# Patient Record
Sex: FEMALE | Race: BLACK OR AFRICAN AMERICAN | Employment: FULL TIME | URBAN - METROPOLITAN AREA
[De-identification: names, ages, dates, MRNs, and addresses within clinical notes are randomized per-mention and may not be internally consistent; named-entity substitution may affect disease eponyms.]

---

## 2017-02-16 ENCOUNTER — OFFICE VISIT (OUTPATIENT)
Dept: FAMILY MEDICINE CLINIC | Age: 57
End: 2017-02-16

## 2017-02-16 VITALS
SYSTOLIC BLOOD PRESSURE: 157 MMHG | HEART RATE: 81 BPM | DIASTOLIC BLOOD PRESSURE: 76 MMHG | TEMPERATURE: 97.9 F | HEIGHT: 67 IN | RESPIRATION RATE: 20 BRPM | WEIGHT: 201.8 LBS | BODY MASS INDEX: 31.67 KG/M2

## 2017-02-16 DIAGNOSIS — R07.9 CHEST PAIN, UNSPECIFIED TYPE: Primary | ICD-10-CM

## 2017-02-16 DIAGNOSIS — R45.89 DEPRESSED MOOD: ICD-10-CM

## 2017-02-16 DIAGNOSIS — R94.31 ABNORMAL EKG: ICD-10-CM

## 2017-02-16 PROBLEM — Z87.19 HISTORY OF LOWER GI BLEEDING: Status: ACTIVE | Noted: 2017-02-16

## 2017-02-16 PROBLEM — K27.9 PEPTIC ULCER DISEASE: Status: ACTIVE | Noted: 2017-02-16

## 2017-02-16 NOTE — PROGRESS NOTES
HISTORY OF PRESENT ILLNESS  Marleni Smart is a 60 y.o. female. New Patient   The history is provided by the patient (she is here to establish care. She just had blood work done last week. ). Associated symptoms include chest pain, headaches and shortness of breath. Pertinent negatives include no abdominal pain. Other   The history is provided by the patient (her blood pressure is elevated today. She is under a lot of stress right now. ). This is a new problem. Episode onset: today. The problem occurs constantly. Progression since onset: unknown. Associated symptoms include chest pain, headaches and shortness of breath. Pertinent negatives include no abdominal pain. Associated symptoms comments: Periodic chest pain. She gets stress headaches. . The symptoms are aggravated by stress. Nothing relieves the symptoms. She has tried nothing for the symptoms. Review of Systems   Constitutional: Positive for diaphoresis and malaise/fatigue. Negative for weight loss. No weight gain   Eyes: Negative for blurred vision. Respiratory: Positive for shortness of breath. Cardiovascular: Positive for chest pain. Negative for leg swelling. The chest pain is sometimes like pressure, sometimes associated with diaphoresis and shortness of breath   Gastrointestinal: Negative for abdominal pain. Neurological: Positive for headaches. Negative for dizziness, sensory change, speech change and focal weakness. Psychiatric/Behavioral: Positive for depression. Negative for suicidal ideas. Visit Vitals    /76 (BP 1 Location: Left arm, BP Patient Position: Sitting)    Pulse 81    Temp 97.9 °F (36.6 °C) (Oral)    Resp 20    Ht 5' 7\" (1.702 m)    Wt 201 lb 12.8 oz (91.5 kg)    BMI 31.61 kg/m2     Physical Exam   Constitutional: She is oriented to person, place, and time. She appears well-developed and well-nourished. No distress.    Cardiovascular: Normal rate, regular rhythm and normal heart sounds. Exam reveals no gallop and no friction rub. No murmur heard. Pulmonary/Chest: Effort normal and breath sounds normal. No respiratory distress. She has no wheezes. She has no rales. Musculoskeletal: She exhibits no edema. Neurological: She is alert and oriented to person, place, and time. Skin: Skin is warm and dry. She is not diaphoretic. Nursing note and vitals reviewed. EKG - NSR without STTW changes, left axis anterior fascicular block, LVH    ASSESSMENT and PLAN    ICD-10-CM ICD-9-CM    1. Chest pain, unspecified type R07.9 786.50 AMB POC EKG ROUTINE W/ 12 LEADS, INTER & REP   2. Abnormal EKG R94.31 794.31    3. Elevated blood pressure I10 401.9    4. Depressed mood F32.9 311         Concerned about cardiac etiology of her chest pain  We have gotten her a Cardiology appointment for tomorrow am    Follow-up Disposition:  Return in about 1 week (around 2/23/2017) for depressed mood, check blood pressure. Reviewed plan of care. Patient has provided input and agrees with goals.

## 2017-02-16 NOTE — MR AVS SNAPSHOT
Visit Information Date & Time Provider Department Dept. Phone Encounter #  
 2/16/2017  9:45 AM Sandhya MotaSandra 34 [id number] Your Appointments 2/17/2017  9:20 AM  
New Patient with Percy Ramos MD  
[facility] ([facility]) Appt Note: Dr Helen Tuttle Dx Chest Pressure CC Holds Record [address] [facility] 600 [address] [place] [address] [address] [facility] [id number] [address] Upcoming Health Maintenance Date Due Hepatitis C Screening 1960 Pneumococcal 19-64 Medium Risk (1 of 1 - PPSV23) 5/12/1977 DTaP/Tdap/Td series (1 - Tdap) 5/12/1979 PAP AKA CERVICAL CYTOLOGY 5/12/1979 BREAST CANCER SCRN MAMMOGRAM 5/12/2008 FOBT Q 1 YEAR AGE 50-75 5/12/2008 FOOT EXAM Q1 8/1/2015 EYE EXAM RETINAL OR DILATED Q1 8/1/2015 HEMOGLOBIN A1C Q6M 9/20/2015 MICROALBUMIN Q1 3/20/2016 LIPID PANEL Q1 3/20/2016 INFLUENZA AGE 9 TO ADULT 8/1/2016 Allergies as of 2/16/2017  Review Complete On: 2/16/2017 By: Sandhya Mota MD  
  
 Severity Noted Reaction Type Reactions Fioricet [Butalbital-acetaminophen-caff] High 08/06/2012    Anaphylaxis, Hives Fiorinal [Butalbital-aspirin-caffeine] High 08/06/2012    Anaphylaxis, Hives Current Immunizations  Never Reviewed No immunizations on file. Not reviewed this visit You Were Diagnosed With   
  
 Codes Comments Chest pain, unspecified type    -  Primary ICD-10-CM: R07.9 ICD-9-CM: 786.50 Abnormal EKG     ICD-10-CM: R94.31 
ICD-9-CM: 794.31 Elevated blood pressure     ICD-10-CM: I10 
ICD-9-CM: 401.9 Depressed mood     ICD-10-CM: F32.9 ICD-9-CM: 709 Vitals BP Pulse Temp Resp Height(growth percentile) Weight(growth percentile)  157/76 (BP 1 Location: Left arm, BP Patient Position: Sitting) 81 97.9 °F (36.6 °C) (Oral) 20 5' 7\" (1.702 m) 201 lb 12.8 oz (91.5 kg) BMI OB Status Smoking Status 31.61 kg/m2 Hysterectomy Former Smoker Vitals History BMI and BSA Data Body Mass Index Body Surface Area  
 31.61 kg/m 2 2.08 m 2 Preferred Pharmacy Pharmacy Name Phone [facility] [phone] Your Updated Medication List  
  
   
This list is accurate as of: 2/16/17 11:28 AM.  Always use your most recent med list.  
  
  
  
  
 CALCIUM-VITAMIN D PO Take  by mouth. FISH OIL PO Take  by mouth. glucose blood VI test strips strip Commonly known as:  [address] Test 2 times daily, Dx. Code 250.00  
  
 metFORMIN 500 mg tablet Commonly known as:  GLUCOPHAGE Take 2 Tabs by mouth two (2) times daily (with meals). STOP GLUMETZA  
  
 multivitamin tablet Commonly known as:  ONE A DAY Take 1 Tab by mouth daily. pantoprazole 40 mg tablet Commonly known as:  PROTONIX Take 40 mg by mouth daily. ZyrTEC 10 mg tablet Generic drug:  cetirizine Take  by mouth as needed. We Performed the Following AMB POC EKG ROUTINE W/ 12 LEADS, INTER & REP [75784 CPT(R)] Introducing [facility] SERVICES! Jennifer Rosenberg introduces Runivermag patient portal. Now you can access parts of your medical record, email your doctor's office, and request medication refills online. 1. In your internet browser, go to [email or web address] 2. Click on the First Time User? Click Here link in the Sign In box. You will see the New Member Sign Up page. 3. Enter your Runivermag Access Code exactly as it appears below. You will not need to use this code after youve completed the sign-up process. If you do not sign up before the expiration date, you must request a new code. · Runivermag Access Code: [email or web address] Expires: 5/17/2017 11:28 AM 
 
4.  Enter the last four digits of your Social Security Number (xxxx) and Date of Birth (mm/dd/yyyy) as indicated and click Submit. You will be taken to the next sign-up page. 5. Create a madvertise ID. This will be your madvertise login ID and cannot be changed, so think of one that is secure and easy to remember. 6. Create a madvertise password. You can change your password at any time. 7. Enter your Password Reset Question and Answer. This can be used at a later time if you forget your password. 8. Enter your e-mail address. You will receive e-mail notification when new information is available in [address] 9. Click Sign Up. You can now view and download portions of your medical record. 10. Click the Download Summary menu link to download a portable copy of your medical information. If you have questions, please visit the Frequently Asked Questions section of the madvertise website. Remember, madvertise is NOT to be used for urgent needs. For medical emergencies, dial 911. Now available from your iPhone and Android! Please provide this summary of care documentation to your next provider. Your primary care clinician is listed as [address]. If you have any questions after today's visit, please call [phone].

## 2017-02-16 NOTE — PROGRESS NOTES
Chief Complaint   Patient presents with    New Patient   [facility]       Given verbal order by Dr. Cedrick Adan to order Amb Poc EKG Routine w/12 leads.

## 2017-08-17 ENCOUNTER — OFFICE VISIT (OUTPATIENT)
Dept: FAMILY MEDICINE CLINIC | Age: 57
End: 2017-08-17

## 2017-08-17 VITALS
OXYGEN SATURATION: 99 % | HEART RATE: 79 BPM | HEIGHT: 67 IN | BODY MASS INDEX: 30.4 KG/M2 | WEIGHT: 193.7 LBS | DIASTOLIC BLOOD PRESSURE: 80 MMHG | SYSTOLIC BLOOD PRESSURE: 140 MMHG | TEMPERATURE: 97.5 F | RESPIRATION RATE: 18 BRPM

## 2017-08-17 DIAGNOSIS — M54.42 ACUTE LEFT-SIDED LOW BACK PAIN WITH LEFT-SIDED SCIATICA: Primary | ICD-10-CM

## 2017-08-17 RX ORDER — CYCLOBENZAPRINE HCL 10 MG
TABLET ORAL
COMMUNITY
End: 2018-01-10

## 2017-08-17 NOTE — PROGRESS NOTES
Subjective:      Ford Lindsey is a 59 y.o. female who presents today for back and hip pain. 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 She states she felt pain immediately and it has since worsened. Pain is rated 7/10. She has used Flexeril at home and by Tuesday evening progressively worse. She last took this Wednesday night. She states symptoms helped her sleep. She states symptoms are now to her lower back with pain radiating down left hip and leg. She has a history of lumbago and DDD. She saw her PCP about this past Tuesday and was told to rest, use ice, and the flexeril. Patient Active Problem List    Diagnosis Date Noted    Peptic ulcer disease 02/16/2017    History of lower GI bleeding 02/16/2017    Vitamin D deficiency     DM (diabetes mellitus) ([facility] [address] [facility]     Degenerative disc disease     Costalchondritis      Current Outpatient Prescriptions   Medication Sig Dispense Refill    metFORMIN (GLUCOPHAGE) 500 mg tablet Take 2 Tabs by mouth two (2) times daily (with meals). STOP GLUMETZA 120 Tab 6    glucose blood VI test strips (ACCU-CHEK SMARTVIEW TEST STRIP) strip Test 2 times daily, Dx. Code 250.00 200 Strip 4    CALCIUM CARB/VIT D3/MINERALS (CALCIUM-VITAMIN D PO) Take  by mouth.  DOCOSAHEXANOIC ACID/EPA (FISH OIL PO) Take  by mouth.  multivitamin (ONE A DAY) tablet Take 1 Tab by mouth daily.  pantoprazole (PROTONIX) 40 mg tablet Take 40 mg by mouth daily.  cetirizine (ZYRTEC) 10 mg tablet Take  by mouth as needed.        Allergies   Allergen Reactions    Fioricet [Butalbital-Acetaminophen-Caff] Anaphylaxis and Hives    Fiorinal [Butalbital-Aspirin-Caffeine] Anaphylaxis and Hives     Past Medical History:   Diagnosis Date    Costalchondritis     Degenerative disc disease     DM (diabetes mellitus) ([facility] [address] [facility]     Ectopic pregnancy     Fibromyalgia     History of lower GI bleeding 2/16/2017    Peptic ulcer disease 2/16/2017    Vitamin D deficiency      Family History   Problem Relation Age of Onset    Hypertension Mother     Elevated Lipids Mother     Other Mother      brain aneurysm    Hypertension Father     COPD Father     Other Sister      brain aneurysm    No Known Problems Brother     No Known Problems Brother     No Known Problems Brother     No Known Problems Brother     Other Brother      hep C    Substance Abuse Brother     Substance Abuse Brother     Other Brother      hep C    Diabetes Brother     Hypertension Brother     Elevated Lipids Brother     No Known Problems Brother     Cancer Sister      throat cancer    Diabetes Sister     Heart Disease Sister 50    Arthritis-osteo Sister     No Known Problems Son      Social History   Substance Use Topics    Smoking status: Former Smoker     Packs/day: 2.00     Years: 15.00     Quit date: 1/1/2002    Smokeless tobacco: Never Used    Alcohol use No        Review of Systems    A comprehensive review of systems was negative except for that written in the HPI. Objective:     Visit Vitals    /80    Pulse 79    Temp 97.5 °F (36.4 °C) (Oral)    Resp 18    Ht 5' 7\" (1.702 m)    Wt 193 lb 11.2 oz (87.9 kg)    SpO2 99%    BMI 30.34 kg/m2     General appearance: alert, cooperative, appears stated age, appears in pain  Head: Normocephalic, without obvious abnormality, atraumatic  Eyes: negative  Neck: supple, symmetrical, trachea midline, no adenopathy and no cervical spinal tenderness  Back: symmetric, no curvature. Decreased rotational ROM. No CVA tenderness. Pain with light palpation to left hip. Positive straight leg raise to left side. Unable to do complete back exam due to pain.    Lungs: clear to auscultation bilaterally  Heart: regular rate and rhythm, S1, S2 normal, no murmur, click, rub or gallop  Extremities: extremities normal, atraumatic, no cyanosis or edema  Pulses: 2+ and symmetric  Neurologic: Alert and oriented X 3, normal strength and tone. Normal symmetric reflexes. Normal coordination and gait  Nursing note and vitals reviewed  Assessment/Plan:       ICD-10-CM ICD-9-CM    1. Acute left-sided low back pain with left-sided sciatica M54.42 724.2      724.3           Advised her to call back or return to office if symptoms worsen/change/persist.  Discussed expected course/resolution/complications of diagnosis in detail with patient. Medication risks/benefits/costs/interactions/alternatives discussed with patient. She was given an after visit summary which includes diagnoses, current medications, & vitals. She expressed understanding with the diagnosis and plan.

## 2017-08-17 NOTE — PROGRESS NOTES
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 Used Flexeril at home and by Tuesday evening progressively worse.  Low back with pain radiating down left hip and leg

## 2017-08-21 ENCOUNTER — OFFICE VISIT (OUTPATIENT)
Dept: FAMILY MEDICINE CLINIC | Age: 57
End: 2017-08-21

## 2017-08-21 VITALS
RESPIRATION RATE: 16 BRPM | TEMPERATURE: 98 F | HEIGHT: 67 IN | OXYGEN SATURATION: 98 % | WEIGHT: 193 LBS | HEART RATE: 64 BPM | DIASTOLIC BLOOD PRESSURE: 77 MMHG | BODY MASS INDEX: 30.29 KG/M2 | SYSTOLIC BLOOD PRESSURE: 139 MMHG

## 2017-08-21 DIAGNOSIS — M54.50 ACUTE MIDLINE LOW BACK PAIN WITHOUT SCIATICA: Primary | ICD-10-CM

## 2017-08-21 RX ORDER — DICLOFENAC SODIUM 10 MG/G
GEL TOPICAL 4 TIMES DAILY
Qty: 100 G | Refills: 1 | Status: SHIPPED | OUTPATIENT
Start: 2017-08-21 | End: 2018-01-31

## 2017-08-21 NOTE — MR AVS SNAPSHOT
Visit Information Date & Time Provider Department Dept. Phone Encounter #  
 8/21/2017  3:45 [facility] [phone] [id number] Follow-up Instructions Return in about 1 week (around 8/28/2017), or if symptoms worsen or fail to improve. Upcoming Health Maintenance Date Due Hepatitis C Screening 1960 Pneumococcal 19-64 Medium Risk (1 of 1 - PPSV23) 5/12/1977 DTaP/Tdap/Td series (1 - Tdap) 5/12/1979 PAP AKA CERVICAL CYTOLOGY 5/12/1979 BREAST CANCER SCRN MAMMOGRAM 5/12/2008 FOBT Q 1 YEAR AGE 50-75 5/12/2008 FOOT EXAM Q1 8/1/2015 EYE EXAM RETINAL OR DILATED Q1 8/1/2015 HEMOGLOBIN A1C Q6M 9/20/2015 MICROALBUMIN Q1 3/20/2016 LIPID PANEL Q1 3/20/2016 INFLUENZA AGE 9 TO ADULT 8/1/2017 Allergies as of 8/21/2017  Review Complete On: 8/21/2017 By: Nato Diaz DO Severity Noted Reaction Type Reactions Latex Medium 08/17/2017    Itching Fioricet [Butalbital-acetaminophen-caff] High 08/06/2012    Anaphylaxis, Hives Fiorinal [Butalbital-aspirin-caffeine] High 08/06/2012    Anaphylaxis, Hives Shellfish Derived Medium 08/17/2017    Hives Current Immunizations  Never Reviewed No immunizations on file. Not reviewed this visit You Were Diagnosed With   
  
 Codes Comments Acute midline low back pain without sciatica    -  Primary ICD-10-CM: M54.5 ICD-9-CM: 724.2 Vitals BP Pulse Temp Resp Height(growth percentile) Weight(growth percentile) 139/77 64 98 °F (36.7 °C) (Oral) 16 5' 7\" (1.702 m) 193 lb (87.5 kg) SpO2 BMI OB Status Smoking Status 98% 30.23 kg/m2 Hysterectomy Former Smoker Vitals History BMI and BSA Data Body Mass Index Body Surface Area  
 30.23 kg/m 2 2.03 m 2 Preferred Pharmacy Pharmacy Name Phone [facility] [address] Your Updated Medication List  
  
 This list is accurate as of: 8/21/17  4:07 PM.  Always use your most recent med list.  
  
  
  
  
 CALCIUM-VITAMIN D PO Take  by mouth. cyclobenzaprine 10 mg tablet Commonly known as:  FLEXERIL Take  by mouth three (3) times daily as needed for Muscle Spasm(s). diclofenac 1 % Gel Commonly known as:  VOLTAREN Apply  to affected area four (4) times daily. As needed FISH OIL PO Take  by mouth. glucose blood VI test strips strip Commonly known as:  [address] Test 2 times daily, Dx. Code 250.00  
  
 metFORMIN 500 mg tablet Commonly known as:  GLUCOPHAGE Take 2 Tabs by mouth two (2) times daily (with meals). STOP GLUMETZA  
  
 multivitamin tablet Commonly known as:  ONE A DAY Take 1 Tab by mouth daily. pantoprazole 40 mg tablet Commonly known as:  PROTONIX Take 40 mg by mouth daily. ZyrTEC 10 mg tablet Generic drug:  cetirizine Take  by mouth as needed. Prescriptions Sent to Pharmacy Refills  
 diclofenac (VOLTAREN) 1 % gel 1 Sig: Apply  to affected area four (4) times daily. As needed Class: Normal  
 Pharmacy: [address] [place] [address] #: [phone] Route: Topical  
  
Follow-up Instructions Return in about 1 week (around 8/28/2017), or if symptoms worsen or fail to improve. Patient Instructions Low Back Pain: Exercises Your Care Instructions Here are some examples of typical rehabilitation exercises for your condition. Start each exercise slowly. Ease off the exercise if you start to have pain. Your doctor or physical therapist will tell you when you can start these exercises and which ones will work best for you. How to do the exercises Press-up 1. Lie on your stomach, supporting your body with your forearms. 2. Press your elbows down into the floor to raise your upper back.  As you do this, relax your stomach muscles and allow your back to arch without using your back muscles. As your press up, do not let your hips or pelvis come off the floor. 3. Hold for 15 to 30 seconds, then relax. 4. Repeat 2 to 4 times. Alternate arm and leg (bird dog) exercise Note: Do this exercise slowly. Try to keep your body straight at all times, and do not let one hip drop lower than the other. 1. Start on the floor, on your hands and knees. 2. Tighten your belly muscles. 3. Raise one leg off the floor, and hold it straight out behind you. Be careful not to let your hip drop down, because that will twist your trunk. 4. Hold for about 6 seconds, then lower your leg and switch to the other leg. 5. Repeat 8 to 12 times on each leg. 6. Over time, work up to holding for 10 to 30 seconds each time. 7. If you feel stable and secure with your leg raised, try raising the opposite arm straight out in front of you at the same time. Knee-to-chest exercise 1. Lie on your back with your knees bent and your feet flat on the floor. 2. Bring one knee to your chest, keeping the other foot flat on the floor (or keeping the other leg straight, whichever feels better on your lower back). 3. Keep your lower back pressed to the floor. Hold for at least 15 to 30 seconds. 4. Relax, and lower the knee to the starting position. 5. Repeat with the other leg. Repeat 2 to 4 times with each leg. 6. To get more stretch, put your other leg flat on the floor while pulling your knee to your chest. 
Curl-ups 1. Lie on the floor on your back with your knees bent at a 90-degree angle. Your feet should be flat on the floor, about 12 inches from your buttocks. 2. Cross your arms over your chest. If this bothers your neck, try putting your hands behind your neck (not your head), with your elbows spread apart. 3. Slowly tighten your belly muscles and raise your shoulder blades off the floor.  
4. Keep your head in line with your body, and do not press your chin to your chest. 
5. Hold this position for 1 or 2 seconds, then slowly lower yourself back down to the floor. 6. Repeat 8 to 12 times. Pelvic tilt exercise 1. Lie on your back with your knees bent. 2. \"Brace\" your stomach. This means to tighten your muscles by pulling in and imagining your belly button moving toward your spine. You should feel like your back is pressing to the floor and your hips and pelvis are rocking back. 3. Hold for about 6 seconds while you breathe smoothly. 4. Repeat 8 to 12 times. Heel dig bridging 1. Lie on your back with both knees bent and your ankles bent so that only your heels are digging into the floor. Your knees should be bent about 90 degrees. 2. Then push your heels into the floor, squeeze your buttocks, and lift your hips off the floor until your shoulders, hips, and knees are all in a straight line. 3. Hold for about 6 seconds as you continue to breathe normally, and then slowly lower your hips back down to the floor and rest for up to 10 seconds. 4. Do 8 to 12 repetitions. Hamstring stretch in doorway 1. Lie on your back in a doorway, with one leg through the open door. 2. Slide your leg up the wall to straighten your knee. You should feel a gentle stretch down the back of your leg. 3. Hold the stretch for at least 15 to 30 seconds. Do not arch your back, point your toes, or bend either knee. Keep one heel touching the floor and the other heel touching the wall. 4. Repeat with your other leg. 5. Do 2 to 4 times for each leg. Hip flexor stretch 1. Kneel on the floor with one knee bent and one leg behind you. Place your forward knee over your foot. Keep your other knee touching the floor. 2. Slowly push your hips forward until you feel a stretch in the upper thigh of your rear leg. 3. Hold the stretch for at least 15 to 30 seconds. Repeat with your other leg. 4. Do 2 to 4 times on each side. Wall sit 1.  Stand with your back 10 to 12 inches away from a wall. 
2. Lean into the wall until your back is flat against it. 3. Slowly slide down until your knees are slightly bent, pressing your lower back into the wall. 4. Hold for about 6 seconds, then slide back up the wall. 5. Repeat 8 to 12 times. Follow-up care is a key part of your treatment and safety. Be sure to make and go to all appointments, and call your doctor if you are having problems. It's also a good idea to know your test results and keep a list of the medicines you take. Where can you learn more? Go to http://alysa-raquel.info/. Enter J107 in the search box to learn more about \"Low Back Pain: Exercises. \" Current as of: March 21, 2017 Content Version: 11.3 © 4402-1840 Healthwise, [employer]orporated. Care instructions adapted under license by [employer] (which disclaims liability or warranty for this information). If you have questions about a medical condition or this instruction, always ask your healthcare professional. [place] [address] any warranty or liability for your use of this information. Introducing [facility] & HEALTH SERVICES! [facility] introduces [employer] patient portal. Now you can access parts of your medical record, email your doctor's office, and request medication refills online. 1. In your internet browser, go to [email or web address] 2. Click on the First Time User? Click Here link in the Sign In box. You will see the New Member Sign Up page. 3. Enter your Xormis Access Code exactly as it appears below. You will not need to use this code after youve completed the sign-up process. If you do not sign up before the expiration date, you must request a new code. · Xormis Access Code: [email or web address] Expires: 11/19/2017  4:07 PM 
 
4. Enter the last four digits of your Social Security Number (xxxx) and Date of Birth (mm/dd/yyyy) as indicated and click Submit. You will be taken to the next sign-up page.  
5. Create a Clarus Systems ID. This will be your [employer] login ID and cannot be changed, so think of one that is secure and easy to remember. 6. Create a [employer] password. You can change your password at any time. 7. Enter your Password Reset Question and Answer. This can be used at a later time if you forget your password. 8. Enter your e-mail address. You will receive e-mail notification when new information is available in [address] 9. Click Sign Up. You can now view and download portions of your medical record. 10. Click the Download Summary menu link to download a portable copy of your medical information. If you have questions, please visit the Frequently Asked Questions section of the [employer] website. Remember, [employer] is NOT to be used for urgent needs. For medical emergencies, dial 911. Now available from your iPhone and Android! Please provide this summary of care documentation to your next provider. Your primary care clinician is listed as [address] [facility]. If you have any questions after today's visit, please call [phone].

## 2017-08-21 NOTE — PATIENT INSTRUCTIONS

## 2017-08-21 NOTE — PROGRESS NOTES
Chief Complaint   Patient presents with    Follow-up     Still with pain in the back now centralized in low back and non raditating

## 2017-08-21 NOTE — PROGRESS NOTES
Mary Acosta is a 59 y.o. female   Chief Complaint   Patient presents with    Follow-up     Still with pain in the back now centralized in low back and non raditating    pt states her pain was a 7/10 a week ago and feels like it is currently a 3/10 as long as she is not doing a lot of lifting. Pain is midline of her lumbar spine. Pain most noticed today standing for a prolonged period of time. Pt thinks she can lift >5 lbs but can not bend to restrain or to lift/pull or transfer someone. Pt is going to be getting an appt with orthopedics for f/u of this workmans comp claim as well. Pt thinks she does not need to see ortho. Pt has been using flexeril, ice and heat with some relief. she is a 59y.o. year old female who presents for evalution. Reviewed PmHx, RxHx, FmHx, SocHx, AllgHx and updated and dated in the chart. Review of Systems - negative except as listed above in the HPI    Objective:     Vitals:    08/21/17 1543   BP: 139/77   Pulse: 64   Resp: 16   Temp: 98 °F (36.7 °C)   TempSrc: Oral   SpO2: 98%   Weight: 193 lb (87.5 kg)   Height: 5' 7\" (1.702 m)       Current Outpatient Prescriptions   Medication Sig    diclofenac (VOLTAREN) 1 % gel Apply  to affected area four (4) times daily. As needed    cyclobenzaprine (FLEXERIL) 10 mg tablet Take  by mouth three (3) times daily as needed for Muscle Spasm(s).  metFORMIN (GLUCOPHAGE) 500 mg tablet Take 2 Tabs by mouth two (2) times daily (with meals). STOP GLUMETZA    glucose blood VI test strips (ACCU-CHEK SMARTVIEW TEST STRIP) strip Test 2 times daily, Dx. Code 250.00    CALCIUM CARB/VIT D3/MINERALS (CALCIUM-VITAMIN D PO) Take  by mouth.  DOCOSAHEXANOIC ACID/EPA (FISH OIL PO) Take  by mouth.  multivitamin (ONE A DAY) tablet Take 1 Tab by mouth daily.  pantoprazole (PROTONIX) 40 mg tablet Take 40 mg by mouth daily.  cetirizine (ZYRTEC) 10 mg tablet Take  by mouth as needed.      No current facility-administered medications for this visit. Physical Examination: General appearance - alert, well appearing, and in no distress  Eyes - pupils equal and reactive, extraocular eye movements intact  Chest - clear to auscultation, no wheezes, rales or rhonchi, symmetric air entry  Heart - normal rate, regular rhythm, normal S1, S2, no murmurs, rubs, clicks or gallops  Back exam - limited range of motion, tenderness noted midline lumbar, neg slr b/l      Assessment/ Plan:   Diagnoses and all orders for this visit:    1. Acute midline low back pain without sciatica  -     diclofenac (VOLTAREN) 1 % gel; Apply  to affected area four (4) times daily. As needed   c/w flexeril and warm compresses, f/u 8/9 for re eval, pt may hold off on ortho for now    No lifting >10 lbs, no bending crawling squatting kneeling pushing or pulling for now  Follow-up Disposition:  Return in about 1 week (around 8/28/2017), or if symptoms worsen or fail to improve. I have discussed the diagnosis with the patient and the intended plan as seen in the above orders. The patient has received an after-visit summary and questions were answered concerning future plans. Pt conveyed understanding of plan.     Medication Side Effects and Warnings were discussed with patient      Dereje Lyn,

## 2017-08-25 ENCOUNTER — OFFICE VISIT (OUTPATIENT)
Dept: FAMILY MEDICINE CLINIC | Age: 57
End: 2017-08-25

## 2017-08-25 VITALS
WEIGHT: 193 LBS | HEART RATE: 66 BPM | SYSTOLIC BLOOD PRESSURE: 141 MMHG | OXYGEN SATURATION: 100 % | HEIGHT: 67 IN | BODY MASS INDEX: 30.29 KG/M2 | RESPIRATION RATE: 16 BRPM | TEMPERATURE: 97.2 F | DIASTOLIC BLOOD PRESSURE: 76 MMHG

## 2017-08-25 DIAGNOSIS — S39.012S LOW BACK STRAIN, SEQUELA: Primary | ICD-10-CM

## 2017-08-25 NOTE — MR AVS SNAPSHOT
Visit Information Date & Time Provider Department Dept. Phone Encounter #  
 8/25/2017  8:00 AM Kia Gupta MD [address] [facility] [place] [phone] [id number] Upcoming Health Maintenance Date Due Hepatitis C Screening 1960 Pneumococcal 19-64 Medium Risk (1 of 1 - PPSV23) 5/12/1977 DTaP/Tdap/Td series (1 - Tdap) 5/12/1979 PAP AKA CERVICAL CYTOLOGY 5/12/1979 BREAST CANCER SCRN MAMMOGRAM 5/12/2008 FOBT Q 1 YEAR AGE 50-75 5/12/2008 FOOT EXAM Q1 8/1/2015 EYE EXAM RETINAL OR DILATED Q1 8/1/2015 HEMOGLOBIN A1C Q6M 9/20/2015 MICROALBUMIN Q1 3/20/2016 LIPID PANEL Q1 3/20/2016 INFLUENZA AGE 9 TO ADULT 8/1/2017 Allergies as of 8/25/2017  Review Complete On: 8/25/2017 By: Kia Gupta MD  
  
 Severity Noted Reaction Type Reactions Latex Medium 08/17/2017    Itching Fioricet [Butalbital-acetaminophen-caff] High 08/06/2012    Anaphylaxis, Hives Fiorinal [Butalbital-aspirin-caffeine] High 08/06/2012    Anaphylaxis, Hives Shellfish Derived Medium 08/17/2017    Hives Current Immunizations  Never Reviewed No immunizations on file. Not reviewed this visit You Were Diagnosed With   
  
 Codes Comments Low back strain, sequela    -  Primary ICD-10-CM: S39.012S ICD-9-CM: 654. 7 Vitals BP Pulse Temp Resp Height(growth percentile) Weight(growth percentile) 141/76 66 97.2 °F (36.2 °C) (Oral) 16 5' 7\" (1.702 m) 193 lb (87.5 kg) SpO2 BMI OB Status Smoking Status 100% 30.23 kg/m2 Hysterectomy Former Smoker Vitals History BMI and BSA Data Body Mass Index Body Surface Area  
 30.23 kg/m 2 2.03 m 2 Preferred Pharmacy Pharmacy Name Phone [address] [place] [address], [address] Your Updated Medication List  
  
   
This list is accurate as of: 8/25/17 10:45 AM.  Always use your most recent med list.  
  
  
  
  
 CALCIUM-VITAMIN D PO Take  by mouth. cyclobenzaprine 10 mg tablet Commonly known as:  FLEXERIL Take  by mouth three (3) times daily as needed for Muscle Spasm(s). diclofenac 1 % Gel Commonly known as:  VOLTAREN Apply  to affected area four (4) times daily. As needed FISH OIL PO Take  by mouth. glucose blood VI test strips strip Commonly known as:  [address] Test 2 times daily, Dx. Code 250.00  
  
 metFORMIN 500 mg tablet Commonly known as:  GLUCOPHAGE Take 2 Tabs by mouth two (2) times daily (with meals). STOP GLUMETZA  
  
 multivitamin tablet Commonly known as:  ONE A DAY Take 1 Tab by mouth daily. pantoprazole 40 mg tablet Commonly known as:  PROTONIX Take 40 mg by mouth daily. ZyrTEC 10 mg tablet Generic drug:  cetirizine Take  by mouth as needed. Introducing \[facility]\"" & HEALTH SERVICES! [facility] introduces [employer] patient portal. Now you can access parts of your medical record, email your doctor's office, and request medication refills online. 1. In your internet browser, go to [email or web address] 2. Click on the First Time User? Click Here link in the Sign In box. You will see the New Member Sign Up page. 3. Enter your [employer] Access Code exactly as it appears below. You will not need to use this code after youve completed the sign-up process. If you do not sign up before the expiration date, you must request a new code. · [employer] Access Code: [email or web address] Expires: 11/19/2017  4:07 PM 
 
4. Enter the last four digits of your Social Security Number (xxxx) and Date of Birth (mm/dd/yyyy) as indicated and click Submit. You will be taken to the next sign-up page. 5. Create a [employer] Fortress ID. This will be your Digital Fortress login ID and cannot be changed, so think of one that is secure and easy to remember. 6. Create a [employer] password. You can change your password at any time.  
7. Enter your Password Reset Question and Answer. This can be used at a later time if you forget your password. 8. Enter your e-mail address. You will receive e-mail notification when new information is available in [address] 9. Click Sign Up. You can now view and download portions of your medical record. 10. Click the Download Summary menu link to download a portable copy of your medical information. If you have questions, please visit the Frequently Asked Questions section of the [employer] network logo website. Remember, [employer] network logo is NOT to be used for urgent needs. For medical emergencies, dial 911. Now available from your iPhone and Android! Please provide this summary of care documentation to your next provider. If you have any questions after today's visit, please call [phone].

## 2017-08-25 NOTE — PROGRESS NOTES
Chief Complaint   Patient presents with    Follow-up     Follow up for [facility] visit.  Pain now at a 1/10 in back

## 2017-08-25 NOTE — PROGRESS NOTES
Chief Complaint   Patient presents with    Follow-up     Follow up for [facility] visit. Pain now at a 1/10 in back     Pt reports that she has been able to gradually resume normal activities. Pt is still using voltaren gel, flexeril and occasionally tylenol. Pt feels ready to resume full duty at work. Pt reports that pain has decreased from 7/10 at time of initial injury, to 3/10 at her last follow up to 1/10 today. Subjective: (As above and below)     Chief Complaint   Patient presents with    Follow-up     Follow up for [facility] visit. Pain now at a 1/10 in back     she is a 59y.o. year old female who presents for evaluation. Reviewed PmHx, RxHx, FmHx, SocHx, AllgHx and updated in chart. Review of Systems - negative except as listed above    Objective:     Vitals:    08/25/17 0812   BP: 141/76   Pulse: 66   Resp: 16   Temp: 97.2 °F (36.2 °C)   TempSrc: Oral   SpO2: 100%   Weight: 193 lb (87.5 kg)   Height: 5' 7\" (1.702 m)     Physical Examination: General appearance - alert, well appearing, and in no distress  Mental status - normal mood, behavior, speech, dress, motor activity, and thought processes  Mouth - mucous membranes moist, pharynx normal without lesions  Chest - clear to auscultation, no wheezes, rales or rhonchi, symmetric air entry  Heart - normal rate, regular rhythm, normal S1, S2, no murmurs, rubs, clicks or gallops  Back exam - full range of motion, mild tenderness over lower midline l-spine, no palpable spasm or pain on motion, sacroiliac joints and sciatic notches nontender    Assessment/ Plan:   1. Low back strain, sequela  -continue medication as needed for PRN use  -may resume full duty work without restrictions 8/28  -forms completed, pt agrees with plan and back care was reviewed along     Follow-up Disposition: As needed  I have discussed the diagnosis with the patient and the intended plan as seen in the above orders.   The patient has received an after-visit summary and questions were answered concerning future plans.      Medication Side Effects and Warnings were discussed with patient: yes  Patient Labs were reviewed: yes  Patient Past Records were reviewed:  yes    Stef Sood M.D.

## 2018-01-10 ENCOUNTER — OFFICE VISIT (OUTPATIENT)
Dept: FAMILY MEDICINE CLINIC | Age: 58
End: 2018-01-10

## 2018-01-10 VITALS
HEIGHT: 67 IN | TEMPERATURE: 98.1 F | RESPIRATION RATE: 18 BRPM | BODY MASS INDEX: 31.23 KG/M2 | DIASTOLIC BLOOD PRESSURE: 76 MMHG | WEIGHT: 199 LBS | HEART RATE: 78 BPM | SYSTOLIC BLOOD PRESSURE: 141 MMHG

## 2018-01-10 DIAGNOSIS — E55.9 VITAMIN D DEFICIENCY: ICD-10-CM

## 2018-01-10 DIAGNOSIS — Z12.39 BREAST CANCER SCREENING: ICD-10-CM

## 2018-01-10 DIAGNOSIS — E11.9 TYPE 2 DIABETES MELLITUS WITHOUT COMPLICATION, WITH LONG-TERM CURRENT USE OF INSULIN (HCC): Primary | ICD-10-CM

## 2018-01-10 DIAGNOSIS — Z79.4 TYPE 2 DIABETES MELLITUS WITHOUT COMPLICATION, WITH LONG-TERM CURRENT USE OF INSULIN (HCC): Primary | ICD-10-CM

## 2018-01-10 DIAGNOSIS — R19.7 DIARRHEA, UNSPECIFIED TYPE: ICD-10-CM

## 2018-01-10 DIAGNOSIS — B35.1 ONYCHOMYCOSIS: ICD-10-CM

## 2018-01-10 DIAGNOSIS — Z12.11 ENCOUNTER FOR FIT (FECAL IMMUNOCHEMICAL TEST) SCREENING: ICD-10-CM

## 2018-01-10 RX ORDER — METFORMIN HYDROCHLORIDE 500 MG/1
1000 TABLET, EXTENDED RELEASE ORAL
Qty: 60 TAB | Refills: 3 | Status: SHIPPED | OUTPATIENT
Start: 2018-01-10 | End: 2018-05-31 | Stop reason: SDUPTHER

## 2018-01-10 NOTE — PROGRESS NOTES
Chief Complaint   Patient presents with    Diabetes     f/u     1. Have you been to the ER, urgent care clinic since your last visit? No  Hospitalized since your last visit? No     2. Have you seen or consulted any other health care providers outside of the [address] [place] [address] since your last visit? Include any pap smears or colon screening.  No

## 2018-01-10 NOTE — MR AVS SNAPSHOT
Visit Information Date & Time Provider Department Dept. Phone Encounter #  
 1/10/2018  9:15 AM Garry Nolen, [place] 34 [id number] Follow-up Instructions Return in about 4 months (around 5/10/2018) for diabetes. Your Appointments 2/7/2018  9:50 AM  
New Patient with MD [facility] Appt Note: self referred,  dm follow up  am12/06/17  
 [address] [place] [address]  
[address] [facility] [address] [address] Upcoming Health Maintenance Date Due Hepatitis C Screening 1960 Pneumococcal 19-64 Medium Risk (1 of 1 - PPSV23) 5/12/1977 DTaP/Tdap/Td series (1 - Tdap) 5/12/1979 PAP AKA CERVICAL CYTOLOGY 5/12/1979 BREAST CANCER SCRN MAMMOGRAM 5/12/2008 FOBT Q 1 YEAR AGE 50-75 5/12/2008 FOOT EXAM Q1 8/1/2015 EYE EXAM RETINAL OR DILATED Q1 8/1/2015 HEMOGLOBIN A1C Q6M 9/20/2015 MICROALBUMIN Q1 3/20/2016 LIPID PANEL Q1 3/20/2016 Allergies as of 1/10/2018  Review Complete On: 1/10/2018 By: Garry Nolen MD  
  
 Severity Noted Reaction Type Reactions Latex Medium 08/17/2017    Itching Fioricet [Butalbital-acetaminophen-caff] High 08/06/2012    Anaphylaxis, Hives Fiorinal [Butalbital-aspirin-caffeine] High 08/06/2012    Anaphylaxis, Hives Shellfish Derived Medium 08/17/2017    Hives Current Immunizations  Never Reviewed No immunizations on file. Not reviewed this visit You Were Diagnosed With   
  
 Codes Comments Type 2 diabetes mellitus without complication, with long-term current use of insulin (HCC)    -  Primary ICD-10-CM: E11.9, Z79.4 ICD-9-CM: 250.00, V58.67 Onychomycosis     ICD-10-CM: B35.1 ICD-9-CM: 110.1 Vitamin D deficiency     ICD-10-CM: E55.9 ICD-9-CM: 268.9 Breast cancer screening     ICD-10-CM: Z12.31 
ICD-9-CM: V76.10  Encounter for FIT (fecal immunochemical test) screening     ICD-10-CM: Z12.11 ICD-9-CM: V76.51 Vitals BP Pulse Temp Resp Height(growth percentile) Weight(growth percentile) 141/76 78 98.1 °F (36.7 °C) (Oral) 18 5' 7\" (1.702 m) 199 lb (90.3 kg) BMI OB Status Smoking Status 31.17 kg/m2 Hysterectomy Former Smoker Vitals History BMI and BSA Data Body Mass Index Body Surface Area  
 31.17 kg/m 2 2.07 m 2 Preferred Pharmacy Pharmacy Name Phone [address], [address] [place] [address] [phone] Your Updated Medication List  
  
   
This list is accurate as of: 1/10/18 10:33 AM.  Always use your most recent med list.  
  
  
  
  
 diclofenac 1 % Gel Commonly known as:  VOLTAREN Apply  to affected area four (4) times daily. As needed  
  
 glucose blood VI test strips strip Commonly known as:  [address] Test 2 times daily, Dx. Code 250.00  
  
 metFORMIN  mg tablet Commonly known as:  GLUCOPHAGE XR Take 2 Tabs by mouth Before breakfast and dinner. multivitamin tablet Commonly known as:  ONE A DAY Take 1 Tab by mouth daily. pantoprazole 40 mg tablet Commonly known as:  PROTONIX Take 40 mg by mouth daily. ZyrTEC 10 mg tablet Generic drug:  cetirizine Take  by mouth as needed. Prescriptions Sent to Pharmacy Refills  
 metFORMIN ER (GLUCOPHAGE XR) 500 mg tablet 3 Sig: Take 2 Tabs by mouth Before breakfast and dinner. Class: Normal  
 Pharmacy: [address] [place] [address]  Ph #: [phone] Route: Oral  
  
We Performed the Following HEMOGLOBIN A1C WITH EAG [35297 CPT(R)] HEPATIC FUNCTION PANEL [10937 CPT(R)]  DIABETES FOOT EXAM [HM7 Custom] METABOLIC PANEL, BASIC [25175 CPT(R)] MICROALBUMIN, UR, RAND W/ MICROALBUMIN/CREA RATIO Z1190928 CPT(R)] NMR LIPOPROFILE [id number] CPT(R)]  OCCULT BLOOD, IMMUNOASSAY (FIT) V7316624 CPT(R)] REFERRAL TO PODIATRY [REF90 Custom] Comments:  
 Diabetes, onychomycosis VITAMIN D, 25 HYDROXY [id number] CPT(R)] Follow-up Instructions Return in about 4 months (around 5/10/2018) for diabetes. To-Do List   
 01/10/2018 Imaging:  YONY MAMMO BI SCREENING INCL CAD Referral Information Referral ID Referred By Referred To  
  
 [id number] Darling Terencetony, DPM   
   [address] [facility], [address] Phone: [phone] Fax: [phone] Visits Status Start Date End Date 1 New Request 1/10/18 1/10/19 If your referral has a status of pending review or denied, additional information will be sent to support the outcome of this decision. Introducing [facility] & HEALTH SERVICES! [facility] introduces fruux patient portal. Now you can access parts of your medical record, email your doctor's office, and request medication refills online. 1. In your internet browser, go to [email or web address] 2. Click on the First Time User? Click Here link in the Sign In box. You will see the New Member Sign Up page. 3. Enter your fruux Access Code exactly as it appears below. You will not need to use this code after youve completed the sign-up process. If you do not sign up before the expiration date, you must request a new code. · fruux Access Code: [email or web address] Expires: 4/10/2018 10:33 AM 
 
4. Enter the last four digits of your Social Security Number (xxxx) and Date of Birth (mm/dd/yyyy) as indicated and click Submit. You will be taken to the next sign-up page. 5. Create a [employer] ID. This will be your fruux login ID and cannot be changed, so think of one that is secure and easy to remember. 6. Create a fruux password. You can change your password at any time. 7. Enter your Password Reset Question and Answer. This can be used at a later time if you forget your password.   
8. Enter your e-mail address. You will receive e-mail notification when new information is available in [address] 9. Click Sign Up. You can now view and download portions of your medical record. 10. Click the Download Summary menu link to download a portable copy of your medical information. If you have questions, please visit the Frequently Asked Questions section of the Snocap website. Remember, Snocap is NOT to be used for urgent needs. For medical emergencies, dial 911. Now available from your iPhone and Android! Please provide this summary of care documentation to your next provider. If you have any questions after today's visit, please call [phone].

## 2018-01-10 NOTE — PROGRESS NOTES
HISTORY OF PRESENT ILLNESS  Clare Mailn is a 59 y.o. female. Diabetes   The history is provided by the patient (her FBS have been around 131, 99, 146. The metformin is causing diarrhea. ). This is a chronic problem. The current episode started more than 1 week ago. The problem occurs constantly. Pertinent negatives include no chest pain, no abdominal pain, no headaches and no shortness of breath. Nothing aggravates the symptoms. The symptoms are relieved by medications. Treatments tried: metformin. Improvement on treatment: uncertain. Review of Systems   Constitutional: Negative for weight loss. Weight gain   Eyes: Positive for blurred vision. Respiratory: Negative for shortness of breath. Cardiovascular: Negative for chest pain and leg swelling. Gastrointestinal: Negative for abdominal pain. Genitourinary:        No polyuria   Neurological: Negative for dizziness, sensory change, speech change, focal weakness and headaches. Endo/Heme/Allergies: Negative for polydipsia. Lab Results   Component Value Date/Time    Hemoglobin A1c 6.9 03/20/2015 10:50 AM    Hemoglobin A1c (POC) 6.7 08/06/2012 09:51 AM           Visit Vitals    /76    Pulse 78    Temp 98.1 °F (36.7 °C) (Oral)    Resp 18    Ht 5' 7\" (1.702 m)    Wt 199 lb (90.3 kg)    BMI 31.17 kg/m2     BP Readings from Last 3 Encounters:   01/10/18 141/76   08/25/17 141/76   08/21/17 139/77     Physical Exam   Constitutional: She is oriented to person, place, and time. She appears well-developed and well-nourished. No distress. Cardiovascular: Normal rate, regular rhythm, normal heart sounds and intact distal pulses. Exam reveals no gallop and no friction rub. No murmur heard. Pulmonary/Chest: Effort normal and breath sounds normal. No respiratory distress. She has no wheezes. She has no rales. Neurological: She is alert and oriented to person, place, and time.    Normal monofilament exam   Skin: Skin is warm and dry. She is not diaphoretic. Feet in good condition. Onychomycosis. Nursing note and vitals reviewed. ASSESSMENT and PLAN    ICD-10-CM ICD-9-CM    1. Type 2 diabetes mellitus without complication, with long-term current use of insulin (HCC) E11.9 250.00 NMR LIPOPROFILE    Z79.4 V58.67 HEMOGLOBIN A1C WITH EAG      MICROALBUMIN, UR, RAND W/ MICROALBUMIN/CREA RATIO       DIABETES FOOT EXAM      METABOLIC PANEL, BASIC      HEPATIC FUNCTION PANEL      REFERRAL TO PODIATRY      metFORMIN ER (GLUCOPHAGE XR) 500 mg tablet   2. Diarrhea, unspecified type R19.7 787.91    3. Onychomycosis B35.1 110.1 REFERRAL TO PODIATRY   4. Vitamin D deficiency E55.9 268.9 VITAMIN D, 25 HYDROXY   5. Breast cancer screening Z12.31 V76.10 YONY MAMMO BI SCREENING INCL CAD   6. Encounter for FIT (fecal immunochemical test) screening Z12.11 V76.51 OCCULT BLOOD, IMMUNOASSAY (FIT)        Uncertain diabetic control  Metformin induced diarrhea  Severe onychomycosis  Labs per orders. Switch to a long acting form of metformin  Podiatry referra;  Mammogram  Foot exam  was performed. .  Sensory and motor testing was assessed . Pedal pulse(s) was assessed. Follow-up Disposition:  Return in about 4 months (around 5/10/2018) for diabetes. Reviewed plan of care. Patient has provided input and agrees with goals.

## 2018-01-11 ENCOUNTER — DOCUMENTATION ONLY (OUTPATIENT)
Dept: FAMILY MEDICINE CLINIC | Age: 58
End: 2018-01-11

## 2018-01-11 LAB
25(OH)D3+25(OH)D2 SERPL-MCNC: 41.5 NG/ML (ref 30–100)
ALBUMIN SERPL-MCNC: 4.4 G/DL (ref 3.5–5.5)
ALBUMIN/CREAT UR: 33.7 MG/G CREAT (ref 0–30)
ALP SERPL-CCNC: 73 IU/L (ref 39–117)
ALT SERPL-CCNC: 12 IU/L (ref 0–32)
AST SERPL-CCNC: 15 IU/L (ref 0–40)
BILIRUB DIRECT SERPL-MCNC: 0.13 MG/DL (ref 0–0.4)
BILIRUB SERPL-MCNC: 0.7 MG/DL (ref 0–1.2)
BUN SERPL-MCNC: 14 MG/DL (ref 6–24)
BUN/CREAT SERPL: 16 (ref 9–23)
CALCIUM SERPL-MCNC: 9.5 MG/DL (ref 8.7–10.2)
CHLORIDE SERPL-SCNC: 105 MMOL/L (ref 96–106)
CHOLEST SERPL-MCNC: 199 MG/DL (ref 100–199)
CO2 SERPL-SCNC: 24 MMOL/L (ref 18–29)
CREAT SERPL-MCNC: 0.85 MG/DL (ref 0.57–1)
CREAT UR-MCNC: 230 MG/DL
EST. AVERAGE GLUCOSE BLD GHB EST-MCNC: 160 MG/DL
GLUCOSE SERPL-MCNC: 126 MG/DL (ref 65–99)
HBA1C MFR BLD: 7.2 % (ref 4.8–5.6)
HDL SERPL-SCNC: 34.1 UMOL/L
HDLC SERPL-MCNC: 51 MG/DL
INTERPRETATION, 910389: NORMAL
LDL SERPL QN: 20 NM
LDL SERPL-SCNC: 2164 NMOL/L
LDL SMALL SERPL-SCNC: 1502 NMOL/L
LDLC SERPL CALC-MCNC: 136 MG/DL (ref 0–99)
LP-IR SCORE SERPL: 46
Lab: NORMAL
MICROALBUMIN UR-MCNC: 77.4 UG/ML
POTASSIUM SERPL-SCNC: 4.2 MMOL/L (ref 3.5–5.2)
PROT SERPL-MCNC: 6.9 G/DL (ref 6–8.5)
SODIUM SERPL-SCNC: 145 MMOL/L (ref 134–144)
TRIGL SERPL-MCNC: 62 MG/DL (ref 0–149)

## 2018-01-15 ENCOUNTER — TELEPHONE (OUTPATIENT)
Dept: FAMILY MEDICINE CLINIC | Age: 58
End: 2018-01-15

## 2018-01-15 NOTE — TELEPHONE ENCOUNTER
Patient informed, that Dr. Mag Pike has not presently reviewed her lab results. I will call her back with lab results after they have been reviewed.

## 2018-01-15 NOTE — TELEPHONE ENCOUNTER
----- Message from Ananth Dears sent at 1/15/2018  1:31 PM EST -----  Regarding: Anastasiia/desiree  Pt is requesting her lab results. Pts number is [phone].

## 2018-01-16 NOTE — TELEPHONE ENCOUNTER
Unable to reach patient by telephone numbers in her chart. Did leave message in her 's Shailesh Notice voicemail to please have patient to give our office a call back.

## 2018-01-16 NOTE — TELEPHONE ENCOUNTER
Pt returned call, was advised of results, agrees to plan, and was scheduled for follow up with Dr. Kamala Davis 1/31/18 at 10:15 am. Xin Erickson

## 2018-01-16 NOTE — TELEPHONE ENCOUNTER
Please call patient and let her know her bad cholesterol is over twice normal, her A1C is high and she has protein in her urine. She needs to schedule an appointment this month and bring her blood sugars.

## 2018-01-18 LAB — HEMOCCULT STL QL IA: NEGATIVE

## 2018-01-31 ENCOUNTER — OFFICE VISIT (OUTPATIENT)
Dept: FAMILY MEDICINE CLINIC | Age: 58
End: 2018-01-31

## 2018-01-31 VITALS
WEIGHT: 195 LBS | DIASTOLIC BLOOD PRESSURE: 68 MMHG | RESPIRATION RATE: 18 BRPM | HEART RATE: 79 BPM | SYSTOLIC BLOOD PRESSURE: 137 MMHG | BODY MASS INDEX: 30.61 KG/M2 | TEMPERATURE: 97.8 F | HEIGHT: 67 IN

## 2018-01-31 DIAGNOSIS — E78.00 HYPERCHOLESTEROLEMIA: ICD-10-CM

## 2018-01-31 DIAGNOSIS — Z12.39 BREAST CANCER SCREENING: ICD-10-CM

## 2018-01-31 DIAGNOSIS — E11.9 TYPE 2 DIABETES MELLITUS WITHOUT COMPLICATION, WITHOUT LONG-TERM CURRENT USE OF INSULIN (HCC): Primary | ICD-10-CM

## 2018-01-31 DIAGNOSIS — Z23 ENCOUNTER FOR IMMUNIZATION: ICD-10-CM

## 2018-01-31 RX ORDER — ROSUVASTATIN CALCIUM 5 MG/1
5 TABLET, COATED ORAL
Qty: 30 TAB | Refills: 3 | Status: SHIPPED | OUTPATIENT
Start: 2018-01-31 | End: 2018-02-12

## 2018-01-31 NOTE — MR AVS SNAPSHOT
[address] [address] [facility] 
[phone] Patient: Herman Perdue MRN: [id number] SYE:0/67/1894 Visit Information Date & Time Provider Department Dept. Phone Encounter #  
 1/31/2018 10:15 AM Iqra Partida [address] [id number] Follow-up Instructions Return in about 3 months (around 4/30/2018) for diabetes. Your Appointments 2/7/2018  9:50 AM  
New Patient with MD [facility]) Appt Note: self referred,  dm follow up  am12/06/17  
 [address] [address] [address]  
[phone]  
  
   
 [address] [address] [address] [address]  
  
    
 5/9/2018 10:45 AM  
ROUTINE CARE with Iqra Partida MD  
[address] [facility]) Appt Note: 4 month follow up DM  
 [address]  
[phone]  
  
   
 [address] [place] [id number] Upcoming Health Maintenance Date Due Hepatitis C Screening 1960 Pneumococcal 19-64 Medium Risk (1 of 1 - PPSV23) 5/12/1977 DTaP/Tdap/Td series (1 - Tdap) 5/12/1979 PAP AKA CERVICAL CYTOLOGY 5/12/1979 BREAST CANCER SCRN MAMMOGRAM 5/12/2008 EYE EXAM RETINAL OR DILATED Q1 8/1/2015 HEMOGLOBIN A1C Q6M 7/10/2018 FOOT EXAM Q1 1/10/2019 MICROALBUMIN Q1 1/10/2019 LIPID PANEL Q1 1/10/2019 FOBT Q 1 YEAR AGE 50-75 1/10/2019 Allergies as of 1/31/2018  Review Complete On: 1/31/2018 By: Iqra Partida MD  
  
 Severity Noted Reaction Type Reactions Latex Medium 08/17/2017    Itching Fioricet [Butalbital-acetaminophen-caff] High 08/06/2012    Anaphylaxis, Hives Fiorinal [Butalbital-aspirin-caffeine] High 08/06/2012    Anaphylaxis, Hives Shellfish Derived Medium 08/17/2017    Hives Current Immunizations  Never Reviewed  No immunizations on file. Not reviewed this visit You Were Diagnosed With   
  
 Codes Comments Breast cancer screening    -  Primary ICD-10-CM: Z12.31 
ICD-9-CM: V76.10 Encounter for immunization     ICD-10-CM: S27 ICD-9-CM: V03.89 Hypercholesterolemia     ICD-10-CM: E78.00 ICD-9-CM: 272.0 Type 2 diabetes mellitus without complication, without long-term current use of insulin (HCC)     ICD-10-CM: E11.9 ICD-9-CM: 250.00 Vitals BP Pulse Temp Resp Height(growth percentile) Weight(growth percentile)  
 137/68 79 97.8 °F (36.6 °C) (Oral) 18 5' 7\" (1.702 m) 195 lb (88.5 kg) BMI OB Status Smoking Status 30.54 kg/m2 Hysterectomy Former Smoker Vitals History BMI and BSA Data Body Mass Index Body Surface Area 30.54 kg/m 2 2.05 m 2 Preferred Pharmacy Pharmacy Name Phone [address], [address] [place] [address] [phone] Your Updated Medication List  
  
   
This list is accurate as of: 1/31/18 11:19 AM.  Always use your most recent med list.  
  
  
  
  
 Diphth, Pertus(Acell), Tetanus 2.5-8-5 Lf-mcg-Lf/0.5mL Susp susp Commonly known as:  BOOSTRIX TDAP  
0.5 mL by IntraMUSCular route once for 1 dose. glucose blood VI test strips strip Commonly known as:  [address] Test 2 times daily, Dx. Code 250.00  
  
 metFORMIN  mg tablet Commonly known as:  GLUCOPHAGE XR Take 2 Tabs by mouth Before breakfast and dinner. multivitamin tablet Commonly known as:  ONE A DAY Take 1 Tab by mouth daily. pantoprazole 40 mg tablet Commonly known as:  PROTONIX Take 40 mg by mouth daily. pneumococcal 23-valent 25 mcg/0.5 mL injection Commonly known as:  PNEUMOVAX 23  
0.5 mL by IntraMUSCular route once for 1 dose. rosuvastatin 5 mg tablet Commonly known as:  CRESTOR Take 1 Tab by mouth nightly. ZyrTEC 10 mg tablet Generic drug:  cetirizine Take  by mouth as needed. Prescriptions Sent to Pharmacy Refills  
 pneumococcal 23-valent (PNEUMOVAX 23) 25 mcg/0.5 mL injection 0 Si.5 mL by IntraMUSCular route once for 1 dose. Class: Normal  
 Pharmacy: [address] [place] [address] Dr Ph #: [phone] Route: IntraMUSCular Rome Gonsalves,, Tetanus (BOOSTRIX TDAP) 2.5-8-5 Lf-mcg-Lf/0.5mL susp susp 0 Si.5 mL by IntraMUSCular route once for 1 dose. Class: Normal  
 Pharmacy: [address] [place] [address] Dr Ph #: [phone] Route: IntraMUSCular  
 rosuvastatin (CRESTOR) 5 mg tablet 3 Sig: Take 1 Tab by mouth nightly. Class: Normal  
 Pharmacy: [address] [place] [address]  Ph #: [phone] Route: Oral  
  
Follow-up Instructions Return in about 3 months (around 2018) for diabetes. To-Do List   
 2018 Imaging:  YONY MAMMO BI SCREENING INCL CAD Around 2018 Lab:  HEPATIC FUNCTION PANEL Around 2018 Lab:  NMR LIPOPROFILE W/IR MARKERS Introducing [facility]! Silvana Hector introduces [employer] patient portal. Now you can access parts of your medical record, email your doctor's office, and request medication refills online. 1. In your internet browser, go to [email or web address] 2. Click on the First Time User? Click Here link in the Sign In box. You will see the New Member Sign Up page. 3. Enter your [employer] Access Code exactly as it appears below. You will not need to use this code after youve completed the sign-up process. If you do not sign up before the expiration date, you must request a new code. · [employer] Access Code: [email or web address] Expires: 4/10/2018 10:33 AM 
 
4. Enter the last four digits of your Social Security Number (xxxx) and Date of Birth (mm/dd/yyyy) as indicated and click Submit. You will be taken to the next sign-up page.  
5. Create a [employer] ID. This will be your [employer] login ID and cannot be changed, so think of one that is secure and easy to remember. 6. Create a [employer] password. You can change your password at any time. 7. Enter your Password Reset Question and Answer. This can be used at a later time if you forget your password. 8. Enter your e-mail address. You will receive e-mail notification when new information is available in [address] 9. Click Sign Up. You can now view and download portions of your medical record. 10. Click the Download Summary menu link to download a portable copy of your medical information. If you have questions, please visit the Frequently Asked Questions section of the [employer] website. Remember, [employer] is NOT to be used for urgent needs. For medical emergencies, dial 911. Now available from your iPhone and Android! Please provide this summary of care documentation to your next provider. If you have any questions after today's visit, please call [phone].

## 2018-01-31 NOTE — PROGRESS NOTES
Subjective:  Carlos Manuel Barrera is 59y.o. year old female who presents today for follow up on her recent abnormal lab results. Her LDL-P was 2162 and her microalbumin was high. Also, her A1C was 7.2. Since I last saw her, she went on a Paleo diet, is eating little meat and exercising. She has lost 4 pounds. Her blood sugars have been up to 140, usually around 96, 92, rarely in the 100's.     Patient Active Problem List   Diagnosis Code    DM (diabetes mellitus) ([facility] [address] E11.9    Degenerative disc disease [id number]    Costalchondritis M94.0    Vitamin D deficiency E55.9    Peptic ulcer disease K27.9    History of lower GI bleeding Z87.19     Past Medical History:   Diagnosis Date    Costalchondritis     Degenerative disc disease     DM (diabetes mellitus) ([facility] [address]     Ectopic pregnancy     Fibromyalgia     History of lower GI bleeding 2/16/2017    Peptic ulcer disease 2/16/2017    Vitamin D deficiency      Past Surgical History:   Procedure Laterality Date    HX GYN      bilateral fallopian tube removal    HX HERNIA REPAIR      HX HYSTERECTOMY      HX OTHER SURGICAL      both fallopian tubes removed      Family History   Problem Relation Age of Onset    Hypertension Mother     Elevated Lipids Mother     Other Mother      brain aneurysm    Hypertension Father     COPD Father     Other Sister      brain aneurysm    No Known Problems Brother     No Known Problems Brother     No Known Problems Brother     No Known Problems Brother     Other Brother      hep C    Substance Abuse Brother     Substance Abuse Brother     Other Brother      hep C    Diabetes Brother     Hypertension Brother     Elevated Lipids Brother     No Known Problems Brother     Cancer Sister      throat cancer    Diabetes Sister     Heart Disease Sister 50    Arthritis-osteo Sister     No Known Problems Son      Social History   Substance Use Topics    Smoking status: Former Smoker     Packs/day: 2.00     Years: 15.00     Quit date: 1/1/2002    Smokeless tobacco: Never Used    Alcohol use No     Allergies   Allergen Reactions    Latex Itching    Fioricet [Butalbital-Acetaminophen-Caff] Anaphylaxis and Hives    Fiorinal [Butalbital-Aspirin-Caffeine] Anaphylaxis and Hives    Shellfish Derived Hives     Current Outpatient Prescriptions   Medication Sig Dispense Refill    metFORMIN ER (GLUCOPHAGE XR) 500 mg tablet Take 2 Tabs by mouth Before breakfast and dinner. 60 Tab 3    multivitamin (ONE A DAY) tablet Take 1 Tab by mouth daily.  pantoprazole (PROTONIX) 40 mg tablet Take 40 mg by mouth daily.  cetirizine (ZYRTEC) 10 mg tablet Take  by mouth as needed.  glucose blood VI test strips (ACCU-CHEK SMARTVIEW TEST STRIP) strip Test 2 times daily, Dx. Code 250.00 200 Strip 4        Lab Results   Component Value Date/Time    Hemoglobin A1c 7.2 01/10/2018 10:50 AM    Hemoglobin A1c (POC) 6.7 08/06/2012 09:51 AM         Objective:  Visit Vitals    /68    Pulse 79    Temp 97.8 °F (36.6 °C) (Oral)    Resp 18    Ht 5' 7\" (1.702 m)    Wt 195 lb (88.5 kg)    BMI 30.54 kg/m2     General appearance - alert, well appearing, and in no distress  Mental status - alert, oriented to person, place, and time, normal mood, behavior, speech, dress, motor activity, and thought processes    Assessment/Plan:    ICD-10-CM ICD-9-CM    1. Type 2 diabetes mellitus without complication, without long-term current use of insulin (HCC) E11.9 250.00    2. Hypercholesterolemia E78.00 272.0 HEPATIC FUNCTION PANEL      NMR LIPOPROFILE W/IR MARKERS      rosuvastatin (CRESTOR) 5 mg tablet   3. Breast cancer screening Z12.31 V76.10 YONY MAMMO BI SCREENING INCL CAD   4.  Encounter for immunization Z23 V03.89 pneumococcal 23-valent (PNEUMOVAX 23) 25 mcg/0.5 mL injection      Diphth, Pertus,Acell,, Tetanus (BOOSTRIX TDAP) 2.5-8-5 Lf-mcg-Lf/0.5mL susp susp       Blood sugars greatly improved with diet and exercise  Will need a statin  Continue current plans. Start Crestor with lipid labs in 3 months  Mammogram  TDAP at pharmacy    Follow-up Disposition:  Return in about 3 months (around 4/30/2018) for diabetes. Reviewed plan of care. Patient has provided input and agrees with goals.

## 2018-01-31 NOTE — PROGRESS NOTES
Chief Complaint   Patient presents with    Cholesterol Problem     high A1C and protein in urine     1. Have you been to the ER, urgent care clinic since your last visit? No Hospitalized since your last visit? No    2. Have you seen or consulted any other health care providers outside of the [address] [place] [address] since your last visit? Include any pap smears or colon screening.  No

## 2018-02-05 ENCOUNTER — TELEPHONE (OUTPATIENT)
Dept: FAMILY MEDICINE CLINIC | Age: 58
End: 2018-02-05

## 2018-02-05 NOTE — TELEPHONE ENCOUNTER
Pt called stating she never received letter with her lab results, does not use Mychart and wants lab letter mailed to her home. Pt also notes she never received call for mammogram and when advised of 3 attempts to schedule, confirmed wrong phone number in system. Phone number updated and pt given central scheduling number. Please mail pt lab letter to her home.  Sarah

## 2018-02-07 NOTE — TELEPHONE ENCOUNTER
I never sent a lab letter because I reviewed her labs in person. Please send her a copy of her labs.

## 2018-02-12 ENCOUNTER — TELEPHONE (OUTPATIENT)
Dept: FAMILY MEDICINE CLINIC | Age: 58
End: 2018-02-12

## 2018-02-12 DIAGNOSIS — E78.00 HYPERCHOLESTEROLEMIA: Primary | ICD-10-CM

## 2018-02-12 NOTE — TELEPHONE ENCOUNTER
Spoke with pt, and she states she just had cholesterol labs drawn, however, she did not fast.     Pt states she does not want to start cholesterol medication, and may not need to be on it. Pt would like to have lipids drawn prior to starting cholesterol medication.

## 2018-02-12 NOTE — TELEPHONE ENCOUNTER
----- Message from Boston Camacho sent at 2/10/2018 10:05 AM EST -----  Regarding: Dr. Duran Stauffer  Pt is calling to speak with the nurse regarding her labs with fasting. The best contact is [phone]. Pt stated if she should wait to take the new cholesterol medication.

## 2018-02-15 ENCOUNTER — HOSPITAL ENCOUNTER (OUTPATIENT)
Dept: MAMMOGRAPHY | Age: 58
Discharge: HOME | End: 2018-02-15
Attending: FAMILY MEDICINE
Payer: COMMERCIAL

## 2018-02-15 DIAGNOSIS — Z12.39 BREAST CANCER SCREENING: ICD-10-CM

## 2018-02-15 PROCEDURE — 77067 SCR MAMMO BI INCL CAD: CPT

## 2018-04-30 ENCOUNTER — TELEPHONE (OUTPATIENT)
Dept: FAMILY MEDICINE CLINIC | Age: 58
End: 2018-04-30

## 2018-04-30 NOTE — TELEPHONE ENCOUNTER
Pt's  called regarding [facility] bill received by pt for labs drawn 1/10/18 for $91.46. Spoke with pt, she is a [employer] employee and has [employer] and states she's unable to get the additional labs ordered drawn because she now has a balance with [employer].    Pt's  called the insurance company and was not able to get information other than suggestion of our office resubmitting the claim with letter of medical necessity. Lab denied may have been NMR, but pt did not know and could not get more information from [facility] or insurance company.  Sarah

## 2018-05-01 DIAGNOSIS — E78.00 HYPERCHOLESTEROLEMIA: ICD-10-CM

## 2018-05-07 NOTE — TELEPHONE ENCOUNTER
----- Message from [address] Ryder Tommy sent at 5/4/2018  9:35 PM EDT -----  Regarding: Dr. Gregory Groves  Pt stated she would like to speak with nurse regarding a bill she received from [employer]. The doctor ordered a detailed test to check her cholesterol and she needs a letter sent  to [facility] stating it was medically necessary. Best  contact is [phone].

## 2018-05-07 NOTE — TELEPHONE ENCOUNTER
Called and spoke with pt, and she states she does not have a fax number on where to send letter, however, was advised to have claim re submitted with additional information. Pt advised we will contact [facility] and give them additional diagnose codes and ask that they resubmit claim. Pt stats understanding. Called [employer]. Diagnoses code has been added and claim resubmitted. With E78.00 added. E11.9 was already listed.

## 2018-05-19 ENCOUNTER — HOSPITAL ENCOUNTER (EMERGENCY)
Age: 58
Discharge: HOME | End: 2018-05-19
Attending: EMERGENCY MEDICINE
Payer: COMMERCIAL

## 2018-05-19 ENCOUNTER — APPOINTMENT (OUTPATIENT)
Dept: GENERAL RADIOLOGY | Age: 58
End: 2018-05-19
Attending: PHYSICIAN ASSISTANT
Payer: COMMERCIAL

## 2018-05-19 VITALS
OXYGEN SATURATION: 99 % | HEIGHT: 69 IN | WEIGHT: 192 LBS | TEMPERATURE: 98.9 F | RESPIRATION RATE: 19 BRPM | HEART RATE: 69 BPM | BODY MASS INDEX: 28.44 KG/M2 | DIASTOLIC BLOOD PRESSURE: 51 MMHG | SYSTOLIC BLOOD PRESSURE: 131 MMHG

## 2018-05-19 DIAGNOSIS — R07.9 CHEST PAIN IN ADULT: Primary | ICD-10-CM

## 2018-05-19 LAB
ALBUMIN SERPL-MCNC: 3.7 G/DL (ref 3.5–5)
ALBUMIN/GLOB SERPL: 0.9 {RATIO} (ref 1.1–2.2)
ALP SERPL-CCNC: 80 U/L (ref 45–117)
ALT SERPL-CCNC: 21 U/L (ref 12–78)
ANION GAP SERPL CALC-SCNC: 11 MMOL/L (ref 5–15)
AST SERPL-CCNC: 17 U/L (ref 15–37)
BASOPHILS # BLD: 0.1 K/UL (ref 0–0.1)
BASOPHILS NFR BLD: 1 % (ref 0–1)
BILIRUB SERPL-MCNC: 0.5 MG/DL (ref 0.2–1)
BUN SERPL-MCNC: 21 MG/DL (ref 6–20)
BUN/CREAT SERPL: 22 (ref 12–20)
CALCIUM SERPL-MCNC: 9.3 MG/DL (ref 8.5–10.1)
CHLORIDE SERPL-SCNC: 106 MMOL/L (ref 97–108)
CO2 SERPL-SCNC: 24 MMOL/L (ref 21–32)
CREAT SERPL-MCNC: 0.97 MG/DL (ref 0.55–1.02)
D DIMER PPP FEU-MCNC: 0.2 MG/L FEU (ref 0–0.65)
DIFFERENTIAL METHOD BLD: NORMAL
EOSINOPHIL # BLD: 0.1 K/UL (ref 0–0.4)
EOSINOPHIL NFR BLD: 1 % (ref 0–7)
ERYTHROCYTE [DISTWIDTH] IN BLOOD BY AUTOMATED COUNT: 12.6 % (ref 11.5–14.5)
GLOBULIN SER CALC-MCNC: 3.9 G/DL (ref 2–4)
GLUCOSE SERPL-MCNC: 147 MG/DL (ref 65–100)
HCT VFR BLD AUTO: 38.7 % (ref 35–47)
HGB BLD-MCNC: 12.5 G/DL (ref 11.5–16)
IMM GRANULOCYTES # BLD: 0 K/UL (ref 0–0.04)
IMM GRANULOCYTES NFR BLD AUTO: 0 % (ref 0–0.5)
LYMPHOCYTES # BLD: 1.6 K/UL (ref 0.8–3.5)
LYMPHOCYTES NFR BLD: 23 % (ref 12–49)
MAGNESIUM SERPL-MCNC: 1.6 MG/DL (ref 1.6–2.4)
MCH RBC QN AUTO: 28.8 PG (ref 26–34)
MCHC RBC AUTO-ENTMCNC: 32.3 G/DL (ref 30–36.5)
MCV RBC AUTO: 89.2 FL (ref 80–99)
MONOCYTES # BLD: 0.3 K/UL (ref 0–1)
MONOCYTES NFR BLD: 5 % (ref 5–13)
NEUTS SEG # BLD: 4.7 K/UL (ref 1.8–8)
NEUTS SEG NFR BLD: 70 % (ref 32–75)
NRBC # BLD: 0 K/UL (ref 0–0.01)
NRBC BLD-RTO: 0 PER 100 WBC
PLATELET # BLD AUTO: 217 K/UL (ref 150–400)
PMV BLD AUTO: 10.6 FL (ref 8.9–12.9)
POTASSIUM SERPL-SCNC: 3.7 MMOL/L (ref 3.5–5.1)
PROT SERPL-MCNC: 7.6 G/DL (ref 6.4–8.2)
RBC # BLD AUTO: 4.34 M/UL (ref 3.8–5.2)
SODIUM SERPL-SCNC: 141 MMOL/L (ref 136–145)
TROPONIN I SERPL-MCNC: <0.04 NG/ML
TROPONIN I SERPL-MCNC: <0.04 NG/ML
TSH SERPL DL<=0.05 MIU/L-ACNC: 1.78 UIU/ML (ref 0.36–3.74)
WBC # BLD AUTO: 6.7 K/UL (ref 3.6–11)

## 2018-05-19 PROCEDURE — 80053 COMPREHEN METABOLIC PANEL: CPT | Performed by: PHYSICIAN ASSISTANT

## 2018-05-19 PROCEDURE — 36415 COLL VENOUS BLD VENIPUNCTURE: CPT | Performed by: PHYSICIAN ASSISTANT

## 2018-05-19 PROCEDURE — 93005 ELECTROCARDIOGRAM TRACING: CPT

## 2018-05-19 PROCEDURE — 84443 ASSAY THYROID STIM HORMONE: CPT | Performed by: PHYSICIAN ASSISTANT

## 2018-05-19 PROCEDURE — 94762 N-INVAS EAR/PLS OXIMTRY CONT: CPT

## 2018-05-19 PROCEDURE — 85379 FIBRIN DEGRADATION QUANT: CPT | Performed by: PHYSICIAN ASSISTANT

## 2018-05-19 PROCEDURE — 99285 EMERGENCY DEPT VISIT HI MDM: CPT

## 2018-05-19 PROCEDURE — 85025 COMPLETE CBC W/AUTO DIFF WBC: CPT | Performed by: PHYSICIAN ASSISTANT

## 2018-05-19 PROCEDURE — 83735 ASSAY OF MAGNESIUM: CPT | Performed by: PHYSICIAN ASSISTANT

## 2018-05-19 PROCEDURE — 74011250637 HC RX REV CODE- 250/637: Performed by: PHYSICIAN ASSISTANT

## 2018-05-19 PROCEDURE — 84484 ASSAY OF TROPONIN QUANT: CPT | Performed by: PHYSICIAN ASSISTANT

## 2018-05-19 PROCEDURE — 71046 X-RAY EXAM CHEST 2 VIEWS: CPT

## 2018-05-19 RX ORDER — GUAIFENESIN 100 MG/5ML
81 LIQUID (ML) ORAL
Status: COMPLETED | OUTPATIENT
Start: 2018-05-19 | End: 2018-05-19

## 2018-05-19 RX ADMIN — ASPIRIN 81 MG 81 MG: 81 TABLET ORAL at 14:54

## 2018-05-19 NOTE — ED PROVIDER NOTES
HPI Comments: 59 y.o. female with past medical history significant for DM, DDD, costochondritis, fibromyalgia, ectopic pregnancy, vitamin D deficiency, peptic ulcer disease, hx of lower GI bleed, hypercholesterolemia and type II DM who presents from home with chief complaint of chest pain. Per pt, she was coming home from work this morning around [id number] when she experienced sudden onset of left sided chest pain with intermittent radiation into her left arm with some SOB. The pt reports that she also experienced palpitations as well as a burning sensation that made her feel like she needed to cough. Pt makes it known that was rather diaphoretic this morning to the point where she needed to change her clothes. Per pt, she has no known cardiac hx. She rates her current chest discomfort 5/10. The pt notes that she is supposed to be taking mediation to treat her cholesterol, however is not taking the medication at this time. Per pt, she has no hx of receiving a cardiac echo or cardiac catheterization. Pt makes it known that her sister hx of bypass at age 50 y.o. She denies fever, chills, N/V/D,  abd pain, back pain, dizziness, headache, numbness and urinary symptoms. There are no other acute medical concerns at this time. Social hx: Former smoker, No ETOH consumption     PCP: Erick Knott MD    Note written by Tory Alejandre, as dictated by Ni Borrero PA-C 12:30 PM        The history is provided by the patient and the spouse. No  was used.         Past Medical History:   Diagnosis Date    Costalchondritis     Degenerative disc disease     DM (diabetes mellitus) ([facility] [address]     Ectopic pregnancy     Fibromyalgia     History of lower GI bleeding 2/16/2017    Hypercholesterolemia 1/31/2018    Peptic ulcer disease 2/16/2017    Type 2 diabetes mellitus without complication (HCC)     Vitamin D deficiency        Past Surgical History:   Procedure Laterality Date    HX GYN bilateral fallopian tube removal    HX HERNIA REPAIR      HX HYSTERECTOMY      HX OTHER SURGICAL      both fallopian tubes removed         Family History:   Problem Relation Age of Onset    Hypertension Mother     Elevated Lipids Mother     Other Mother      brain aneurysm    Hypertension Father     COPD Father     Other Sister      brain aneurysm    No Known Problems Brother     No Known Problems Brother     No Known Problems Brother     No Known Problems Brother     Other Brother      hep C    Substance Abuse Brother     Substance Abuse Brother     Other Brother      hep C    Diabetes Brother     Hypertension Brother     Elevated Lipids Brother     No Known Problems Brother     Cancer Sister      throat cancer    Diabetes Sister     Heart Disease Sister 50    Arthritis-osteo Sister     No Known Problems Son        Social History     Social History    Marital status:      Spouse name: N/A    Number of children: N/A    Years of education: N/A     Occupational History    Not on file. Social History Main Topics    Smoking status: Former Smoker     Packs/day: 2.00     Years: 15.00     Quit date: 1/1/2002    Smokeless tobacco: Never Used    Alcohol use No    Drug use: No    Sexual activity: Yes     Partners: Male     Birth control/ protection: None     Other Topics Concern    Not on file     Social History Narrative         ALLERGIES: Latex; Fioricet [butalbital-acetaminophen-caff]; Fiorinal [butalbital-aspirin-caffeine]; and Shellfish derived    Review of Systems   Constitutional: Positive for diaphoresis. Negative for appetite change, chills, fatigue and fever. HENT: Negative for congestion, ear pain, postnasal drip, rhinorrhea, sneezing and sore throat. Eyes: Negative for redness and visual disturbance. Respiratory: Negative for cough, shortness of breath and wheezing. Cardiovascular: Positive for chest pain (left sided with intermittent radiation into her LUE. ) and palpitations. Negative for leg swelling. Gastrointestinal: Negative for abdominal pain, anal bleeding, constipation, diarrhea, nausea and vomiting. Genitourinary: Negative for difficulty urinating, dysuria, frequency and hematuria. Musculoskeletal: Negative for arthralgias, back pain, myalgias and neck stiffness. Skin: Negative for rash. Allergic/Immunologic: Negative for immunocompromised state. Neurological: Negative for dizziness, syncope, weakness, light-headedness, numbness and headaches. Hematological: Negative for adenopathy. Patient Vitals for the past 12 hrs:   Temp Pulse Resp BP SpO2   05/19/18 1600 - 69 19 131/51 -   05/19/18 1530 - 69 13 139/73 -   05/19/18 1515 - 77 20 137/73 99 %   05/19/18 1501 - 73 22 - 98 %   05/19/18 1500 - 74 21 142/77 98 %   05/19/18 1459 - 77 21 - 98 %   05/19/18 1445 - 81 17 139/73 99 %   05/19/18 1430 - 77 20 145/70 98 %   05/19/18 1415 - 77 19 130/60 99 %   05/19/18 1400 - 81 15 140/71 99 %   05/19/18 1345 - 79 19 142/68 98 %   05/19/18 1331 - 80 19 146/63 97 %   05/19/18 1250 98.9 °F (37.2 °C) - - - -   05/19/18 1235 - 90 16 168/79 100 %         Physical Exam   Constitutional: She is oriented to person, place, and time. She appears well-developed and well-nourished. No distress. HENT:   Head: Normocephalic and atraumatic. Right Ear: External ear normal.   Left Ear: External ear normal.   Eyes: EOM are normal. Pupils are equal, round, and reactive to light. Neck: Neck supple. Cardiovascular: Normal rate, regular rhythm, normal heart sounds and intact distal pulses. Exam reveals no gallop and no friction rub. No murmur heard. Pulmonary/Chest: Effort normal and breath sounds normal. No stridor. No respiratory distress. She has no wheezes. She has no rales. She exhibits no tenderness. Abdominal: Soft. Bowel sounds are normal. She exhibits no distension and no mass. There is no tenderness. There is no rebound and no guarding. Musculoskeletal: Normal range of motion. She exhibits no edema, tenderness or deformity. Neurological: She is alert and oriented to person, place, and time. No cranial nerve deficit. Coordination normal.   Skin: No rash noted. No erythema. No pallor. Psychiatric: She has a normal mood and affect. Her behavior is normal.   Nursing note and vitals reviewed. MDM  Number of Diagnoses or Management Options  Chest pain in adult:      Amount and/or Complexity of Data Reviewed  Clinical lab tests: ordered and reviewed  Tests in the radiology section of CPT®: ordered and reviewed  Tests in the medicine section of CPT®: reviewed and ordered  Obtain history from someone other than the patient: yes ()  Review and summarize past medical records: yes  Independent visualization of images, tracings, or specimens: yes    Patient Progress  Patient progress: stable        ED Course       Procedures    12:35 PM  Discussed pt, sx, hx and current findings with Becca Cesar MD. She is in agreement with plan and matute see pt. Will get cardiac labs, ekg, cxr and give low dose asa as pt notes she can not generally take asa given a hx of ulcers. Pt has a  HEART Score 3  Sequim Ronald. JACE Doherty      ED EKG interpretation: 12:29 PM  Rhythm: normal sinus rhythm and sinus arrhythmia; and regular . Rate (approx.): 84; Axis: left axis deviation; P wave: normal; QRS interval: normal ; ST/T wave: normal; Other findings: LVH abnormal ekg. This EKG was interpreted by Becca Cesar MD,ED Provider. 2:15 PM  Pt with increased pain. Will repeat ekg  Swapna Cardenas. JACE Doherty    ED EKG interpretation: 2:21 PM  Rhythm: normal sinus rhythm; and regular . Rate (approx.): 76; Axis: left axis deviation; P wave: normal; QRS interval: normal ; ST/T wave: normal; Other findings: LVH, abnormal EKG. This EKG was interpreted by Becca Cesar MD,ED Provider. 3:29 PM  Swapna Cardenas. JACE Doherty spoke with Dr. Kellen Aguirre, Consult for Cardiology. Discussed available diagnostic tests and clinical findings. He is in agreement with care plans as outlined. He recommends sending pt home if trop x 2 neg. He wants pt to follow up Monday at 11 am. She is to be npo 2 hours pta  GEMA Elder    LABORATORY TESTS:  Recent Results (from the past 12 hour(s))   CBC WITH AUTOMATED DIFF    Collection Time: 05/19/18 12:46 PM   Result Value Ref Range    WBC 6.7 3.6 - 11.0 K/uL    RBC 4.34 3.80 - 5.20 M/uL    HGB 12.5 11.5 - 16.0 g/dL    HCT 38.7 35.0 - 47.0 %    MCV 89.2 80.0 - 99.0 FL    MCH 28.8 26.0 - 34.0 PG    MCHC 32.3 30.0 - 36.5 g/dL    RDW 12.6 11.5 - 14.5 %    PLATELET 120 219 - 576 K/uL    MPV 10.6 8.9 - 12.9 FL    NRBC 0.0 0  WBC    ABSOLUTE NRBC 0.00 0.00 - 0.01 K/uL    NEUTROPHILS 70 32 - 75 %    LYMPHOCYTES 23 12 - 49 %    MONOCYTES 5 5 - 13 %    EOSINOPHILS 1 0 - 7 %    BASOPHILS 1 0 - 1 %    IMMATURE GRANULOCYTES 0 0.0 - 0.5 %    ABS. NEUTROPHILS 4.7 1.8 - 8.0 K/UL    ABS. LYMPHOCYTES 1.6 0.8 - 3.5 K/UL    ABS. MONOCYTES 0.3 0.0 - 1.0 K/UL    ABS. EOSINOPHILS 0.1 0.0 - 0.4 K/UL    ABS. BASOPHILS 0.1 0.0 - 0.1 K/UL    ABS. IMM. GRANS. 0.0 0.00 - 0.04 K/UL    DF AUTOMATED     METABOLIC PANEL, COMPREHENSIVE    Collection Time: 05/19/18 12:46 PM   Result Value Ref Range    Sodium 141 136 - 145 mmol/L    Potassium 3.7 3.5 - 5.1 mmol/L    Chloride 106 97 - 108 mmol/L    CO2 24 21 - 32 mmol/L    Anion gap 11 5 - 15 mmol/L    Glucose 147 (H) 65 - 100 mg/dL    BUN 21 (H) 6 - 20 MG/DL    Creatinine 0.97 0.55 - 1.02 MG/DL    BUN/Creatinine ratio 22 (H) 12 - 20      GFR est AA >60 >60 ml/min/1.73m2    GFR est non-AA 59 (L) >60 ml/min/1.73m2    Calcium 9.3 8.5 - 10.1 MG/DL    Bilirubin, total 0.5 0.2 - 1.0 MG/DL    ALT (SGPT) 21 12 - 78 U/L    AST (SGOT) 17 15 - 37 U/L    Alk.  phosphatase 80 45 - 117 U/L    Protein, total 7.6 6.4 - 8.2 g/dL    Albumin 3.7 3.5 - 5.0 g/dL    Globulin 3.9 2.0 - 4.0 g/dL    A-G Ratio 0.9 (L) 1.1 - 2.2     MAGNESIUM    Collection Time: 05/19/18 12:46 PM   Result Value Ref Range    Magnesium 1.6 1.6 - 2.4 mg/dL   TROPONIN I    Collection Time: 05/19/18 12:46 PM   Result Value Ref Range    Troponin-I, Qt. <0.04 <0.05 ng/mL   D DIMER    Collection Time: 05/19/18 12:46 PM   Result Value Ref Range    D-dimer 0.20 0.00 - 0.65 mg/L FEU   TSH 3RD GENERATION    Collection Time: 05/19/18  1:08 PM   Result Value Ref Range    TSH 1.78 0.36 - 3.74 uIU/mL   EKG, 12 LEAD, INITIAL    Collection Time: 05/19/18  2:21 PM   Result Value Ref Range    Ventricular Rate 76 BPM    Atrial Rate 76 BPM    P-R Interval 162 ms    QRS Duration 120 ms    Q-T Interval 394 ms    QTC Calculation (Bezet) 443 ms    Calculated P Axis 44 degrees    Calculated R Axis -41 degrees    Calculated T Axis 30 degrees    Diagnosis       Normal sinus rhythm  Left axis deviation  Left ventricular hypertrophy with QRS widening  Abnormal ECG  When compared with ECG of 19-MAY-2018 12:29,  MANUAL COMPARISON REQUIRED, DATA IS UNCONFIRMED     TROPONIN I    Collection Time: 05/19/18  2:54 PM   Result Value Ref Range    Troponin-I, Qt. <0.04 <0.05 ng/mL       IMAGING RESULTS:    Xr Chest Pa Lat    Result Date: 5/19/2018  INDICATION:   Chest Pain COMPARISON: None FINDINGS: Frontal and lateral views of the chest demonstrate a normal cardiomediastinal silhouette. The lungs are adequately expanded. There is no edema, effusion, consolidation, or pneumothorax. The osseous structures are unremarkable. IMPRESSION: No acute process. MEDICATIONS GIVEN:  Medications   aspirin chewable tablet 81 mg (81 mg Oral Given 5/19/18 6661)       IMPRESSION:  1. Chest pain in adult        PLAN:  1.    Discharge Medication List as of 5/19/2018  3:56 PM      CONTINUE these medications which have NOT CHANGED    Details   metFORMIN ER (GLUCOPHAGE XR) 500 mg tablet Take 2 Tabs by mouth Before breakfast and dinner., Normal, Disp-60 Tab, R-3      glucose blood VI test strips (ACCU-CHEK SMARTVIEW TEST STRIP) strip Test 2 times daily, Dx. Code 250.00, Normal, Disp-200 Strip, R-4      multivitamin (ONE A DAY) tablet Take 1 Tab by mouth daily. Historical Med, 1 Tab      pantoprazole (PROTONIX) 40 mg tablet Take 40 mg by mouth daily. Historical Med, 40 mg      cetirizine (ZYRTEC) 10 mg tablet Take  by mouth as needed. Historical Med           2. Follow-up Information     Follow up With Details Comments Josh Poole MD  follow up in the office at 11 am Monday 4/21/18 with nothing to eat or drink 2 hours prior to the appointment [address] [facility]  [phone]          Return to ED if worse       3:57 PM  Pt has been reexamined. Pt has no new complaints, changes or physical findings. Care plan outlined and precautions discussed. All available results were reviewed with pt. All medications were reviewed with pt. All of pt's questions and concerns were addressed. Pt agrees to F/U as instructed and agrees to return to ED upon further deterioration. Pt is ready to go home.   GEMA Elder

## 2018-05-19 NOTE — DISCHARGE INSTRUCTIONS
Chest Pain: Care Instructions  Your Care Instructions    There are many things that can cause chest pain. Some are not serious and will get better on their own in a few days. But some kinds of chest pain need more testing and treatment. Your doctor may have recommended a follow-up visit in the next 8 to 12 hours. If you are not getting better, you may need more tests or treatment. Even though your doctor has released you, you still need to watch for any problems. The doctor carefully checked you, but sometimes problems can develop later. If you have new symptoms or if your symptoms do not get better, get medical care right away. If you have worse or different chest pain or pressure that lasts more than 5 minutes or you passed out (lost consciousness), call 911 or seek other emergency help right away. A medical visit is only one step in your treatment. Even if you feel better, you still need to do what your doctor recommends, such as going to all suggested follow-up appointments and taking medicines exactly as directed. This will help you recover and help prevent future problems. How can you care for yourself at home? · Rest until you feel better. · Take your medicine exactly as prescribed. Call your doctor if you think you are having a problem with your medicine. · Do not drive after taking a prescription pain medicine. When should you call for help? Call 911 if:  ? · You passed out (lost consciousness). ? · You have severe difficulty breathing. ? · You have symptoms of a heart attack. These may include:  ¨ Chest pain or pressure, or a strange feeling in your chest.  ¨ Sweating. ¨ Shortness of breath. ¨ Nausea or vomiting. ¨ Pain, pressure, or a strange feeling in your back, neck, jaw, or upper belly or in one or both shoulders or arms. ¨ Lightheadedness or sudden weakness. ¨ A fast or irregular heartbeat.   After you call 911, the  may tell you to chew 1 adult-strength or 2 to 4 low-dose aspirin. Wait for an ambulance. Do not try to drive yourself. ?Call your doctor today if:  ? · You have any trouble breathing. ? · Your chest pain gets worse. ? · You are dizzy or lightheaded, or you feel like you may faint. ? · You are not getting better as expected. ? · You are having new or different chest pain. Where can you learn more? Go to http://alysa-raquel.info/. Enter A120 in the search box to learn more about \"Chest Pain: Care Instructions. \"  Current as of: March 20, 2017  Content Version: 11.4  © 5462-8098 [employer]. Care instructions adapted under license by [employer] (which disclaims liability or warranty for this information). If you have questions about a medical condition or this instruction, always ask your healthcare professional. [place] [address] any warranty or liability for your use of this information. We hope that we have addressed all of your medical concerns. The examination and treatment you received in the Emergency Department were for an emergent problem and were not intended as complete care. It is important that you follow up with your healthcare provider(s) for ongoing care. If your symptoms worsen or do not improve as expected, and you are unable to reach your usual health care provider(s), you should return to the Emergency Department. Today's healthcare is undergoing tremendous change, and patient satisfaction surveys are one of the many tools to assess the quality of medical care. You may receive a survey from the [employer] organization regarding your experience in the Emergency Department. I hope that your experience has been completely positive, particularly the medical care that I provided. As such, please participate in the survey; anything less than excellent does not meet my expectations or intentions.         [facility] and [employer] participate in nationally recognized quality of care measures. If your blood pressure is greater than 120/80, as reported below, we urge that you seek medical care to address the potential of high blood pressure, commonly known as hypertension. Hypertension can be hereditary or can be caused by certain medical conditions, pain, stress, or \"white coat syndrome. \"       Please make an appointment with your health care provider(s) for follow up of your Emergency Department visit. VITALS:   Patient Vitals for the past 8 hrs:   Temp Pulse Resp BP SpO2   05/19/18 1530 - 69 13 139/73 -   05/19/18 1515 - 77 20 137/73 99 %   05/19/18 1501 - 73 22 - 98 %   05/19/18 1500 - 74 21 142/77 98 %   05/19/18 1459 - 77 21 - 98 %   05/19/18 1445 - 81 17 139/73 99 %   05/19/18 1430 - 77 20 145/70 98 %   05/19/18 1415 - 77 19 130/60 99 %   05/19/18 1400 - 81 15 140/71 99 %   05/19/18 1345 - 79 19 142/68 98 %   05/19/18 1331 - 80 19 146/63 97 %   05/19/18 1250 98.9 °F (37.2 °C) - - - -   05/19/18 1235 - 90 16 168/79 100 %          Thank you for allowing us to provide you with medical care today. We realize that you have many choices for your emergency care needs. Please choose us in the future for any continued health care needs. Jimbo Doherty, [address] [place] [address].   Office: [phone]            Recent Results (from the past 24 hour(s))   CBC WITH AUTOMATED DIFF    Collection Time: 05/19/18 12:46 PM   Result Value Ref Range    WBC 6.7 3.6 - 11.0 K/uL    RBC 4.34 3.80 - 5.20 M/uL    HGB 12.5 11.5 - 16.0 g/dL    HCT 38.7 35.0 - 47.0 %    MCV 89.2 80.0 - 99.0 FL    MCH 28.8 26.0 - 34.0 PG    MCHC 32.3 30.0 - 36.5 g/dL    RDW 12.6 11.5 - 14.5 %    PLATELET 248 764 - 372 K/uL    MPV 10.6 8.9 - 12.9 FL    NRBC 0.0 0  WBC    ABSOLUTE NRBC 0.00 0.00 - 0.01 K/uL    NEUTROPHILS 70 32 - 75 %    LYMPHOCYTES 23 12 - 49 %    MONOCYTES 5 5 - 13 %    EOSINOPHILS 1 0 - 7 % BASOPHILS 1 0 - 1 %    IMMATURE GRANULOCYTES 0 0.0 - 0.5 %    ABS. NEUTROPHILS 4.7 1.8 - 8.0 K/UL    ABS. LYMPHOCYTES 1.6 0.8 - 3.5 K/UL    ABS. MONOCYTES 0.3 0.0 - 1.0 K/UL    ABS. EOSINOPHILS 0.1 0.0 - 0.4 K/UL    ABS. BASOPHILS 0.1 0.0 - 0.1 K/UL    ABS. IMM. GRANS. 0.0 0.00 - 0.04 K/UL    DF AUTOMATED     METABOLIC PANEL, COMPREHENSIVE    Collection Time: 05/19/18 12:46 PM   Result Value Ref Range    Sodium 141 136 - 145 mmol/L    Potassium 3.7 3.5 - 5.1 mmol/L    Chloride 106 97 - 108 mmol/L    CO2 24 21 - 32 mmol/L    Anion gap 11 5 - 15 mmol/L    Glucose 147 (H) 65 - 100 mg/dL    BUN 21 (H) 6 - 20 MG/DL    Creatinine 0.97 0.55 - 1.02 MG/DL    BUN/Creatinine ratio 22 (H) 12 - 20      GFR est AA >60 >60 ml/min/1.73m2    GFR est non-AA 59 (L) >60 ml/min/1.73m2    Calcium 9.3 8.5 - 10.1 MG/DL    Bilirubin, total 0.5 0.2 - 1.0 MG/DL    ALT (SGPT) 21 12 - 78 U/L    AST (SGOT) 17 15 - 37 U/L    Alk. phosphatase 80 45 - 117 U/L    Protein, total 7.6 6.4 - 8.2 g/dL    Albumin 3.7 3.5 - 5.0 g/dL    Globulin 3.9 2.0 - 4.0 g/dL    A-G Ratio 0.9 (L) 1.1 - 2.2     MAGNESIUM    Collection Time: 05/19/18 12:46 PM   Result Value Ref Range    Magnesium 1.6 1.6 - 2.4 mg/dL   TROPONIN I    Collection Time: 05/19/18 12:46 PM   Result Value Ref Range    Troponin-I, Qt. <0.04 <0.05 ng/mL   D DIMER    Collection Time: 05/19/18 12:46 PM   Result Value Ref Range    D-dimer 0.20 0.00 - 0.65 mg/L FEU   TSH 3RD GENERATION    Collection Time: 05/19/18  1:08 PM   Result Value Ref Range    TSH 1.78 0.36 - 3.74 uIU/mL   TROPONIN I    Collection Time: 05/19/18  2:54 PM   Result Value Ref Range    Troponin-I, Qt. <0.04 <0.05 ng/mL       Xr Chest Pa Lat    Result Date: 5/19/2018  INDICATION:   Chest Pain COMPARISON: None FINDINGS: Frontal and lateral views of the chest demonstrate a normal cardiomediastinal silhouette. The lungs are adequately expanded. There is no edema, effusion, consolidation, or pneumothorax.  The osseous structures are unremarkable. IMPRESSION: No acute process.

## 2018-05-19 NOTE — ED TRIAGE NOTES
Pt states she is having \"a strange feeling in my chest\" and describes as a chest pressure with intermittent flip flopping sensation. Pt reports sweating early today to the point of changing her clothes.

## 2018-05-19 NOTE — LETTER
[address] 
[facility] EMERGENCY DEPT 
[address] [place] [address] [id number] [phone] Work/School Note Date: 5/19/2018 To Whom It May concern: 
 
Kel Ayers was seen and treated today in the emergency room by the following provider(s): 
Attending Provider: Remedios Sahu MD 
Physician Assistant: GEMA Laurent. Kel Ayers may return to work on 5/22/18.  
 
Sincerely, 
 
 
 
 
GEMA Laurent

## 2018-05-20 LAB
ATRIAL RATE: 76 BPM
ATRIAL RATE: 84 BPM
CALCULATED P AXIS, ECG09: 44 DEGREES
CALCULATED P AXIS, ECG09: 53 DEGREES
CALCULATED R AXIS, ECG10: -38 DEGREES
CALCULATED R AXIS, ECG10: -41 DEGREES
CALCULATED T AXIS, ECG11: 30 DEGREES
CALCULATED T AXIS, ECG11: 60 DEGREES
DIAGNOSIS, 93000: NORMAL
DIAGNOSIS, 93000: NORMAL
P-R INTERVAL, ECG05: 156 MS
P-R INTERVAL, ECG05: 162 MS
Q-T INTERVAL, ECG07: 370 MS
Q-T INTERVAL, ECG07: 394 MS
QRS DURATION, ECG06: 104 MS
QRS DURATION, ECG06: 120 MS
QTC CALCULATION (BEZET), ECG08: 437 MS
QTC CALCULATION (BEZET), ECG08: 443 MS
VENTRICULAR RATE, ECG03: 76 BPM
VENTRICULAR RATE, ECG03: 84 BPM

## 2018-05-21 ENCOUNTER — CLINICAL SUPPORT (OUTPATIENT)
Dept: CARDIOLOGY CLINIC | Age: 58
End: 2018-05-21

## 2018-05-21 ENCOUNTER — TELEPHONE (OUTPATIENT)
Dept: FAMILY MEDICINE CLINIC | Age: 58
End: 2018-05-21

## 2018-05-21 ENCOUNTER — OFFICE VISIT (OUTPATIENT)
Dept: CARDIOLOGY CLINIC | Age: 58
End: 2018-05-21

## 2018-05-21 VITALS
BODY MASS INDEX: 28.29 KG/M2 | HEART RATE: 85 BPM | WEIGHT: 191 LBS | HEIGHT: 69 IN | SYSTOLIC BLOOD PRESSURE: 150 MMHG | DIASTOLIC BLOOD PRESSURE: 80 MMHG

## 2018-05-21 DIAGNOSIS — E11.9 TYPE 2 DIABETES MELLITUS WITHOUT COMPLICATION, WITHOUT LONG-TERM CURRENT USE OF INSULIN (HCC): ICD-10-CM

## 2018-05-21 DIAGNOSIS — E78.00 HYPERCHOLESTEROLEMIA: ICD-10-CM

## 2018-05-21 DIAGNOSIS — I10 ESSENTIAL HYPERTENSION: ICD-10-CM

## 2018-05-21 DIAGNOSIS — R07.9 CHEST PAIN, UNSPECIFIED TYPE: Primary | ICD-10-CM

## 2018-05-21 DIAGNOSIS — R07.89 CHEST DISCOMFORT: Primary | ICD-10-CM

## 2018-05-21 NOTE — PROGRESS NOTES
Explained procedure to patient, monitoring EKG/HR/BP,  walking on treadmill, and risks/discomforts. All concerns and questions addressed. Consent obtained. See scanned report.  ordered and Dr. Mer Santiago read study. ID verified per protocol. Pt  reported no symptoms at completion of protocol.

## 2018-05-21 NOTE — TELEPHONE ENCOUNTER
----- Message from Nathaly Castorena sent at 5/19/2018 12:01 PM EDT -----  Regarding: /Telephone  Pt requesting to schedule an appt today Saturday 05/19/18 regarding a funny feeling in chest and feeling really tired. Pt was informed the office was closed today and the next available with provider was Monday 05/21/18 at 9:30 AM. Pt decline appt. Pt was offered to be transferred to the answer service where the on call doctor could be page and pt declined and stated she will got to ER. No best contact number left.

## 2018-05-21 NOTE — PROGRESS NOTES
Porsche Baldwin MD. [facility] [place]              Patient: Scot Avalos  : 1960      Today's Date: 2018            HISTORY OF PRESENT ILLNESS:     History of Present Illness:  Ms. Haja Ramos was referred by the ER [facility]) after a visit there on 18 for chest pain. The ER note states \"59 y.o. female with past medical history significant for DM, DDD, costochondritis, fibromyalgia, ectopic pregnancy, vitamin D deficiency, peptic ulcer disease, hx of lower GI bleed, hypercholesterolemia and type II DM who presents from home with chief complaint of chest pain. Per pt, she was coming home from work this morning around [id number] when she experienced sudden onset of left sided chest pain with intermittent radiation into her left arm with some SOB. The pt reports that she also experienced palpitations as well as a burning sensation that made her feel like she needed to cough. Pt makes it known that was rather diaphoretic this morning to the point where she needed to change her clothes. \".    Workup in ER (EKG, trop) was normal and she was sent home. She is doing well without any chest pain since leaving the ER. No SOB. She feels well overall. She says she has palpitations periodically. No exertional chest pain. She walks daily.           PAST MEDICAL HISTORY:     Past Medical History:   Diagnosis Date    Costalchondritis     Degenerative disc disease     DM [facility]di[facility] mellitus) ([facility] [address]     Ectopic pregnancy     Fibromyalgia     History of lower GI bleeding 2017    Hypercholesterolemia 2018    Peptic ulcer disease 2017    Type 2 diabetes mellitus without complication (HCC)     Vitamin D deficiency        Past Surgical History:   Procedure Laterality Date    HX GYN      bilateral fallopian tube removal    HX HERNIA REPAIR      HX HYSTERECTOMY      HX OTHER SURGICAL      both fallopian tubes removed         MEDICATIONS:       Current Outpatient Prescriptions on File Prior to Visit   Medication Sig Dispense Refill    metFORMIN ER (GLUCOPHAGE XR) 500 mg tablet Take 2 Tabs by mouth Before breakfast and dinner. 60 Tab 3    multivitamin (ONE A DAY) tablet Take 1 Tab by mouth daily.  pantoprazole (PROTONIX) 40 mg tablet Take 40 mg by mouth daily.  cetirizine (ZYRTEC) 10 mg tablet Take  by mouth as needed.  glucose blood VI test strips (ACCU-CHEK SMARTVIEW TEST STRIP) strip Test 2 times daily, Dx. Code 250.00 200 Strip 4     No current facility-administered medications on file prior to visit. Allergies   Allergen Reactions    Latex Itching    Fioricet [Butalbital-Acetaminophen-Caff] Anaphylaxis and Hives    Fiorinal [Butalbital-Aspirin-Caffeine] Anaphylaxis and Hives    Shellfish Derived Hives             SOCIAL HISTORY:     Social History   Substance Use Topics    Smoking status: Former Smoker     Packs/day: 2.00     Years: 15.00     Quit date: 1/1/2002    Smokeless tobacco: Never Used    Alcohol use No         FAMILY HISTORY:     Family History   Problem Relation Age of Onset    Hypertension Mother     Elevated Lipids Mother     Other Mother      brain aneurysm    Hypertension Father     COPD Father     Other Sister      brain aneurysm    No Known Problems Brother     No Known Problems Brother     No Known Problems Brother     No Known Problems Brother     Other Brother      hep C    Substance Abuse Brother     Substance Abuse Brother     Other Brother      hep C    Diabetes Brother     Hypertension Brother     Elevated Lipids Brother     No Known Problems Brother     Cancer Sister      throat cancer    Diabetes Sister     Heart Disease Sister 50    Arthritis-osteo Sister     No Known Problems Son              REVIEW OF SYMPTOMS:     Review of Symptoms:  Constitutional: Negative for fever, chills  HEENT: Negative for nosebleeds, tinnitus, and vision changes.    Respiratory: Negative for cough, wheezing  Cardiovascular: Negative for orthopnea, claudication, syncope, and PND. Gastrointestinal: Negative for abdominal pain, diarrhea, melena. Genitourinary: Negative for dysuria  Musculoskeletal: Negative for myalgias. Skin: Negative for rash  Heme: No problems bleeding. Neurological: Negative for speech change and focal weakness. PHYSICAL EXAM:     Physical Exam:  Visit Vitals    /80    Pulse 85    Ht 5' 9\" (1.753 m)    Wt 191 lb (86.6 kg)    BMI 28.21 kg/m2     Patient appears generally well, mood and affect are appropriate and pleasant. HEENT:  Hearing intact, non-icteric, normocephalic, atraumatic. Neck Exam: Supple, No JVD or carotid bruits. Lung Exam: Clear to auscultation, even breath sounds. Cardiac Exam: Regular rate and rhythm with no murmur  Abdomen: Soft, non-tender, normal bowel sounds. No bruits or masses. Extremities: Moves all ext well. No lower extremity edema. Vascular: 2+ dorsalis pedis pulses bilaterally. Psych: Appropriate affect  Neuro - Grossly intact        LABS / OTHER STUDIES:       Lab Results   Component Value Date/Time    Sodium 141 05/19/2018 12:46 PM    Potassium 3.7 05/19/2018 12:46 PM    Chloride 106 05/19/2018 12:46 PM    CO2 24 05/19/2018 12:46 PM    Anion gap 11 05/19/2018 12:46 PM    Glucose 147 (H) 05/19/2018 12:46 PM    BUN 21 (H) 05/19/2018 12:46 PM    Creatinine 0.97 05/19/2018 12:46 PM    BUN/Creatinine ratio 22 (H) 05/19/2018 12:46 PM    GFR est AA >60 05/19/2018 12:46 PM    GFR est non-AA 59 (L) 05/19/2018 12:46 PM    Calcium 9.3 05/19/2018 12:46 PM    Bilirubin, total 0.5 05/19/2018 12:46 PM    AST (SGOT) 17 05/19/2018 12:46 PM    Alk.  phosphatase 80 05/19/2018 12:46 PM    Protein, total 7.6 05/19/2018 12:46 PM    Albumin 3.7 05/19/2018 12:46 PM    Globulin 3.9 05/19/2018 12:46 PM    A-G Ratio 0.9 (L) 05/19/2018 12:46 PM    ALT (SGPT) 21 05/19/2018 12:46 PM       Lab Results   Component Value Date/Time    WBC 6.7 05/19/2018 12:46 PM    HGB 12.5 05/19/2018 12:46 PM HCT 38.7 05/19/2018 12:46 PM    PLATELET 205 35/91/7053 12:46 PM    MCV 89.2 05/19/2018 12:46 PM     Component      Latest Ref Rng & Units 1/10/2018          10:50 AM   LDL-P      <1000 nmol/L 2164 (H)   LDL-C      0 - 99 mg/dL 136 (H)   HDL-C      >39 mg/dL 51   Triglycerides      0 - 149 mg/dL 62   Cholesterol, total      100 - 199 mg/dL 199   HDL-P (Total)      >=30.5 umol/L 34.1   Small LDL-P      <=527 nmol/L 1502 (H)   LDL size      >20.5 nm 20.0   LP-IR SCORE      <=45 46 (H)       Lab Results   Component Value Date/Time    TSH 1.78 05/19/2018 01:08 PM     Component      Latest Ref Rng & Units 5/19/2018 5/19/2018          12:46 PM 12:46 PM   Troponin-I, Qt.      <0.05 ng/mL <0.04    D-dimer      0.00 - 0.65 mg/L FEU  0.20     Lab Results   Component Value Date/Time    Hemoglobin A1c 7.2 (H) 01/10/2018 10:50 AM    Hemoglobin A1c (POC) 6.7 08/06/2012 09:51 AM           CARDIAC DIAGNOSTICS:     Cardiac Evaluation Includes:    EKG 5/19/18- NSR, borderline LVH    CXR 5/19/18 - No acute process    Treadmill Stress test 5/21/18 - EKG with NSR and LVH. Walked 4 min (7.0 METS). No CP and normal stress EKG. I visualized CXR and EKG myself. ASSESSMENT AND PLAN:     Assessment and Plan:  1) Atypical chest pain recently   - normal treadmill stress test without any high risk findings   - She denies further pain since hospital visit     2) CV risk management / Dyslipidemia   - I recommended statin for primary prevention given her DM. She prefers to avoid a statin for now and work on diet before considering a statin. - she has been unable to tolerate ASA (history of ulcers)     3) LVH on EKG   - check an echo to assess heart structure     4) HTN  - Mildly elevated BP in office readings  - She says /70's at home - she will follow at home     5) See me back in one year. See Dr. Manav Hernandez for primary prevention measures. See me back as needed.   Patient expressed understanding of the plan - questions were answered. 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 Valerie Silva MD, [address], [address] [place] [address] [address] [place] [address], [address]  [place], [address] [place] [address]  Ph: [phone]   Ph [phone]      ADDENDUM   5/23/2018  Echo 5/21/18 - LVEF 60 % to 65 %. Grade 1 diastolic dysfunction. RV Normal AV sclerosis. Echo overall looks OK. LV size and function is normal.   Will have nurse call with results. ADDENDUM   5/17/2020  Records reviewed.   She went to ED with atypical CP 5/7/20 -\   EKG 5/7/20 - NSR, LVH, cannot rule out septal infarct  Labs 5/7/20 - BNP 16, trop negative, BMP OK  CBC OK   CXR OK

## 2018-05-25 ENCOUNTER — TELEPHONE (OUTPATIENT)
Dept: CARDIOLOGY CLINIC | Age: 58
End: 2018-05-25

## 2018-05-25 NOTE — TELEPHONE ENCOUNTER
----- Message from Flory Nunez MD sent at 5/23/2018  9:53 PM EDT -----  Regarding: please call patient  Echo 5/21/18 - LVEF 60 % to 65 %. Grade 1 diastolic dysfunction. RV Normal AV sclerosis. Echo overall looks OK. LV size and function is normal.   Will have nurse call with results.          Thanks,  SK

## 2018-05-29 DIAGNOSIS — Z79.4 TYPE 2 DIABETES MELLITUS WITHOUT COMPLICATION, WITH LONG-TERM CURRENT USE OF INSULIN (HCC): ICD-10-CM

## 2018-05-29 DIAGNOSIS — E11.9 TYPE 2 DIABETES MELLITUS WITHOUT COMPLICATION, WITH LONG-TERM CURRENT USE OF INSULIN (HCC): ICD-10-CM

## 2018-05-29 RX ORDER — METFORMIN HYDROCHLORIDE 500 MG/1
1000 TABLET, EXTENDED RELEASE ORAL
Qty: 60 TAB | Refills: 3 | Status: CANCELLED | OUTPATIENT
Start: 2018-05-29

## 2018-05-31 ENCOUNTER — TELEPHONE (OUTPATIENT)
Dept: FAMILY MEDICINE CLINIC | Age: 58
End: 2018-05-31

## 2018-05-31 DIAGNOSIS — E11.9 TYPE 2 DIABETES MELLITUS WITHOUT COMPLICATION, WITH LONG-TERM CURRENT USE OF INSULIN (HCC): ICD-10-CM

## 2018-05-31 DIAGNOSIS — Z79.4 TYPE 2 DIABETES MELLITUS WITHOUT COMPLICATION, WITH LONG-TERM CURRENT USE OF INSULIN (HCC): ICD-10-CM

## 2018-05-31 RX ORDER — METFORMIN HYDROCHLORIDE 500 MG/1
1000 TABLET, EXTENDED RELEASE ORAL
Qty: 60 TAB | Refills: 3 | Status: SHIPPED | OUTPATIENT
Start: 2018-05-31 | End: 2019-06-16 | Stop reason: SDUPTHER

## 2018-05-31 NOTE — TELEPHONE ENCOUNTER
Pt called needing to let Dr Akosua Peralta know that she is dropping weight on the 1000mg of metformin and is now down to 185lbs. She decreased the dose to 500mg and her BS are running . She is also extremely upset that is has been 2 days and Dr Akosau Peralta has not approved the refill for her metformin. I informed her that Dr Akosua Peralta needed 48-72hrs to address refills and she thinks that is unacceptable and states she is completely out of her medication.  Please call [phone]

## 2019-04-16 ENCOUNTER — ED HISTORICAL/CONVERTED ENCOUNTER (OUTPATIENT)
Dept: OTHER | Age: 59
End: 2019-04-16

## 2019-06-16 DIAGNOSIS — Z79.4 TYPE 2 DIABETES MELLITUS WITHOUT COMPLICATION, WITH LONG-TERM CURRENT USE OF INSULIN (HCC): ICD-10-CM

## 2019-06-16 DIAGNOSIS — E11.9 TYPE 2 DIABETES MELLITUS WITHOUT COMPLICATION, WITH LONG-TERM CURRENT USE OF INSULIN (HCC): ICD-10-CM

## 2019-06-16 RX ORDER — METFORMIN HYDROCHLORIDE 500 MG/1
TABLET, EXTENDED RELEASE ORAL
Qty: 60 TAB | Refills: 0 | Status: SHIPPED | OUTPATIENT
Start: 2019-06-16

## 2019-06-16 NOTE — LETTER
6/17/2019 8:24 AM 
 
Ms. Brandt Ayers [address] [facility] [address] [id number] Dear Ms. Claudia García missed you! Please call our office at [phone] and schedule a diabetic follow up appointment for your continued care. I will be unable to continue to refill your medications, until you have been seen at an appointment. Look forward to seeing you soon.   
 
 
 
Sincerely, 
 
 
Hood Pena MD

## 2019-06-17 NOTE — TELEPHONE ENCOUNTER
Please call patient and schedule them for a diabetic follow up. Let her know I cannot continue to refill her medication until she comes in.

## 2019-10-01 ENCOUNTER — HOSPITAL ENCOUNTER (OUTPATIENT)
Dept: MAMMOGRAPHY | Age: 59
Discharge: HOME | End: 2019-10-01
Attending: STUDENT IN AN ORGANIZED HEALTH CARE EDUCATION/TRAINING PROGRAM
Payer: COMMERCIAL

## 2019-10-01 DIAGNOSIS — Z12.39 SCREENING BREAST EXAMINATION: ICD-10-CM

## 2019-10-01 PROCEDURE — 77063 BREAST TOMOSYNTHESIS BI: CPT

## 2019-10-01 PROCEDURE — 77067 SCR MAMMO BI INCL CAD: CPT

## 2020-05-07 ENCOUNTER — ED HISTORICAL/CONVERTED ENCOUNTER (OUTPATIENT)
Dept: OTHER | Age: 60
End: 2020-05-07

## 2020-05-07 ENCOUNTER — TELEPHONE (OUTPATIENT)
Dept: CARDIOLOGY CLINIC | Age: 60
End: 2020-05-07

## 2020-05-07 NOTE — TELEPHONE ENCOUNTER
Patient called with symptoms of intermittent  chest heaviness x 1 month and \"prickly\" feeling on left side of chest/shoulder. Light headed/dizziness. All symptoms with minimal activity. States she just got off a 12 hour night shift at [employer] and her BP while working was 430'S systolic. States she sees bounding in her neck at times as well. Approximately a month ago she had a cough and was worked up through her primary care to include a chest x-ray and flu test. Patient states it was determined to be allergies. She was hesitant to go to the ED, but agreed to do so as she was still in the parking lot and did not want to drive to [place] to see a provider. Attempted to reach a cardiologist in her area, but she is not agreeable to start with a virtual visit. Patient states she will go in to ED for evaluation.

## 2020-05-08 NOTE — TELEPHONE ENCOUNTER
Called pt to f/u  Yesterday, she went to ED at [facility].  They did EKG,   He recommended stress test.  Said abnormal, but they didn't discuss what was abnormal.    Labs looked good. Still not feeling well. Pt stated that \"CP more like cartilage between ribs sore. \"  Has been having cold/sinus, feels like it hasn't been taking care of.      BP in /? Exertional HTN, CP    PT stated she is moving next week  ED physician thinks it is mx strain. Offered to make VV apt with Dr. Isha Trotter, she declined as she is moving. [facility] received from [facility]. Placed on your desk for review.

## 2020-05-28 ENCOUNTER — OFFICE VISIT (OUTPATIENT)
Dept: URGENT CARE | Age: 60
End: 2020-05-28

## 2020-05-28 VITALS — RESPIRATION RATE: 16 BRPM | TEMPERATURE: 98.9 F | OXYGEN SATURATION: 96 % | HEART RATE: 70 BPM

## 2020-05-28 DIAGNOSIS — Z20.822 EXPOSURE TO COVID-19 VIRUS: Primary | ICD-10-CM

## 2020-05-28 NOTE — PROGRESS NOTES
Subjective: (As above and below)       This patient was seen in Flu Clinic at [address] [place] [address] Urgent Care while in their vehicle due to COVID-19 pandemic with PPE and focused examination in order to decrease community viral transmission. The patient/guardian gave verbal consent to treat. 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 female who presents for covid testing  + known exposure at work  Denies any active symptoms specifically no cough, fever, runny nose, masalise    Recent travel: no  Known Exposure to COVID-19: YES  Known flu or strep contact: no     Review of Systems - negative except as listed above    Reviewed PmHx, RxHx, FmHx, SocHx, AllgHx and updated in chart.   Family History   Problem Relation Age of Onset    Hypertension Mother     Elevated Lipids Mother     Other Mother         brain aneurysm    Hypertension Father     COPD Father     Other Sister         brain aneurysm    No Known Problems Brother     No Known Problems Brother     No Known Problems Brother     No Known Problems Brother     Other Brother         hep C    Substance Abuse Brother     Substance Abuse Brother     Other Brother         hep C    Diabetes Brother     Hypertension Brother     Elevated Lipids Brother     No Known Problems Brother     Cancer Sister         throat cancer    Diabetes Sister     Heart Disease Sister 50    Arthritis-osteo Sister     No Known Problems Son        Past Medical History:   Diagnosis Date    Costalchondritis     Degenerative disc disease     DM [facility]di[facility] mellitus) ([facility] [address]     Ectopic pregnancy     Fibromyalgia     History of lower GI bleeding 2/16/2017    Hypercholesterolemia 1/31/2018    Peptic ulcer disease 2/16/2017    Type 2 diabetes mellitus without complication (HCC)     Vitamin D deficiency       Social History     Socioeconomic History    Marital status:      Spouse name: Not on file    Number of children: Not on file    Years of education: Not on file    Highest education level: Not on file   Tobacco Use    Smoking status: Former Smoker     Packs/day: 2.00     Years: 15.00     Pack years: 30.00     Last attempt to quit: 2002     Years since quittin.4    Smokeless tobacco: Never Used   Substance and Sexual Activity    Alcohol use: No    Drug use: No    Sexual activity: Yes     Partners: Male     Birth control/protection: None          Current Outpatient Medications   Medication Sig    metFORMIN ER (GLUCOPHAGE XR) 500 mg tablet TAKE 2 TABLETS BY MOUTH BEFORE BREAKFAST AND DINNER    glucose blood VI test strips (ACCU-CHEK SMARTVIEW TEST STRIP) strip Test 2 times daily, Dx. Code 250.00    multivitamin (ONE A DAY) tablet Take 1 Tab by mouth daily.  pantoprazole (PROTONIX) 40 mg tablet Take 40 mg by mouth daily.  cetirizine (ZYRTEC) 10 mg tablet Take  by mouth as needed. No current facility-administered medications for this visit. Objective:     Vitals:    20 1307   Pulse: 70   Resp: 16   Temp: 98.9 °F (37.2 °C)   SpO2: 96%       Physical Exam  General appearance - appears well hydrated and does not appear toxic, no acute distress  Eyes - EOMs intact. Non injected. No scleral icterus   Mouth - OP mild erythema without swelling, exudate or lesion. Mucus membranes moist. Uvula midline. Neck/Lymphatics - trachea midline, full AROM  Chest - normal breathing effort. No active cough. Skin - no observable rashes or pallor  Neurologic- alert and oriented x 3  Psychiatric- normal mood, behavior and though content. Assessment/ Plan:     1. Exposure to COVID-19 virus  - NOVEL CORONAVIRUS (COVID-19)  Will call with results  Self isolation  Supportive care, fluids, deep breathing if develops symptoms  ED for worsening. Follow up:   We have reviewed, in detail, particular presentations/worsening/concerning signs and symptoms that may warrant seeking immediate care in the ED setting and patient verbalized being aware of what to watch for. For other non-severe changes, non-improvement or questions, patient aware to contact PCP office or consider a virtual online medical consultation. Reviewed with her that COVID-19 pandemic is an evolving situation with rapidly changing recommendations & guidelines. Medical decisions are made based on the the best information available at the time.   Recommended she stay tuned for updates published by trusted sources and to advise your PCP of any unexpected changes in clinical condition     Yamila Walker NP

## 2020-05-30 LAB — SARS-COV-2, NAA: NOT DETECTED

## 2020-05-30 NOTE — PROGRESS NOTES
Spoke with patient regarding negative COVID-19 result. Pt verbalized understanding, no further questions at this time.

## 2020-06-12 ENCOUNTER — OFFICE VISIT (OUTPATIENT)
Dept: OBGYN CLINIC | Age: 60
End: 2020-06-12

## 2020-06-12 ENCOUNTER — NURSE TRIAGE (OUTPATIENT)
Dept: OTHER | Facility: CLINIC | Age: 60
End: 2020-06-12

## 2020-06-12 VITALS
SYSTOLIC BLOOD PRESSURE: 140 MMHG | DIASTOLIC BLOOD PRESSURE: 78 MMHG | HEIGHT: 68 IN | WEIGHT: 170 LBS | BODY MASS INDEX: 25.76 KG/M2

## 2020-06-12 DIAGNOSIS — R10.2 PELVIC PAIN IN FEMALE: Primary | ICD-10-CM

## 2020-06-12 NOTE — TELEPHONE ENCOUNTER
Pt report that she is calling to see about going to see a [address] of spine is having burning sensation. Pt report numbness and tingling to right shoulder at times. Pt callinfgbecause she would like to see a Neurologist     Reason for Disposition   Back pain    Answer Assessment - Initial Assessment Questions  1. ONSET: \"When did the pain begin? \"       1 week     2. LOCATION: \"Where does it hurt? \" (upper, mid or lower back)      Mid     3. SEVERITY: \"How bad is the pain? \"  (e.g., Scale 1-10; mild, moderate, or severe)    - MILD (1-3): doesn't interfere with normal activities     - MODERATE (4-7): interferes with normal activities or awakens from sleep     - SEVERE (8-10): excruciating pain, unable to do any normal activities       5/10    4. PATTERN: \"Is the pain constant? \" (e.g., yes, no; constant, intermittent)       Yes. Constant     5. RADIATION: \"Does the pain shoot into your legs or elsewhere? \"      Right shoulder and gurwinder hands     6. CAUSE:  \"What do you think is causing the back pain? \"       Unsure    7. BACK OVERUSE:  Tasha Rivero recent lifting of heavy objects, strenuous work or exercise? \"      No     8. MEDICATIONS: \"What have you taken so far for the pain? \" (e.g., nothing, acetaminophen, NSAIDS)        9. NEUROLOGIC SYMPTOMS: \"Do you have any weakness, numbness, or problems with bowel/bladder control? \"      Numbness and tingling   10. OTHER SYMPTOMS: \"Do you have any other symptoms? \" (e.g., fever, abdominal pain, burning with urination, blood in urine)        Headache     11. PREGNANCY: \"Is there any chance you are pregnant? \" (e.g., yes, no; LMP)        N/A    Protocols used: BACK PAIN-ADULT-[place]

## 2020-06-12 NOTE — PROGRESS NOTES
Problem Visit    Chelo Snell is a 56 y.o. postmenopausal female presenting for problem visit. Her main concern today is pain and nodularity on right mons/groin. Pt is s/p hysterectomy, but thinks she still has at least one ovary, unsure if still has a cervix. Recent unintentional wt loss of 12-15lbs in past 1.5 months. +vaginal dryness    Ob/Gyn Hx:  G1  S/p hyst    Health maintenance:  Pap- no longer indicated, no cervix, and no abnormal paps in > 20 years  Mammo-2 yrs ago  Colonoscopy- denies    Past Medical History:   Diagnosis Date    Diabetes ([facility] [address]        Past Surgical History:   Procedure Laterality Date    HX HERNIA REPAIR      HX HYSTERECTOMY      lots of scar tissue noted    HX SALPINECTOMY       ruptured ectopic    HX TUBAL LIGATION         No family history on file.     Social History     Socioeconomic History    Marital status:      Spouse name: Not on file    Number of children: Not on file    Years of education: Not on file    Highest education level: Not on file   Occupational History    Not on file   Social Needs    Financial resource strain: Not on file    Food insecurity     Worry: Not on file     Inability: Not on file    Transportation needs     Medical: Not on file     Non-medical: Not on file   Tobacco Use    Smoking status: Former Smoker     Last attempt to quit: 2000     Years since quittin.0    Smokeless tobacco: Never Used   Substance and Sexual Activity    Alcohol use: Not Currently    Drug use: Never    Sexual activity: Yes     Partners: Male     Birth control/protection: None   Lifestyle    Physical activity     Days per week: Not on file     Minutes per session: Not on file    Stress: Not on file   Relationships    Social connections     Talks on phone: Not on file     Gets together: Not on file     Attends Church service: Not on file     Active member of club or organization: Not on file     Attends meetings of clubs or organizations: Not on file     Relationship status: Not on file    Intimate partner violence     Fear of current or ex partner: Not on file     Emotionally abused: Not on file     Physically abused: Not on file     Forced sexual activity: Not on file   Other Topics Concern    Not on file   Social History Narrative    Not on file           Not on File    Review of Systems - History obtained from the patient  Constitutional: negative for weight loss, fever, night sweats  HEENT: negative for hearing loss, earache, congestion, snoring, sorethroat  CV: negative for chest pain, palpitations, edema  Resp: negative for cough, shortness of breath, wheezing  GI: negative for change in bowel habits, abdominal pain, black or bloody stools  : negative for frequency, dysuria, hematuria, vaginal discharge, +right groin pain/lump  MSK: negative for back pain, joint pain, muscle pain  Breast: negative for breast lumps, nipple discharge, galactorrhea  Skin :negative for itching, rash, hives  Neuro: negative for dizziness, headache, confusion, weakness  Psych: negative for anxiety, depression, change in mood  Heme/lymph: negative for bleeding, bruising, pallor    Physical Exam  There were no vitals taken for this visit.      Constitutional  · Appearance: well-nourished, well developed, alert, in no acute distress    HENT  · Head and Face: appears normal    Neck  · Inspection/Palpation: normal appearance, no masses or tenderness  · Lymph Nodes: no lymphadenopathy present  · Thyroid: gland size normal, nontender, no nodules or masses present on palpation    Chest  · Respiratory Effort: non-labored breathing  · Auscultation: CTAB, normal breath sounds    Cardiovascular  · Heart:  · Auscultation: regular rate and rhythm without murmur  · Extremities: no peripheral edema    Breasts  · Inspection of Breasts: breasts symmetrical, no skin changes, no discharge present, nipple appearance normal, no skin retraction present  · Palpation of Breasts and Axillae: no masses present on palpation, no breast tenderness  · Axillary Lymph Nodes: no lymphadenopathy present    Gastrointestinal  · Abdominal Examination: abdomen non-tender to palpation, normal bowel sounds, no masses present  · Liver and spleen: no hepatomegaly present, spleen not palpable  · Hernias: no hernias identified    Genitourinary  · External Genitalia: normal appearance for age, no discharge present, no tenderness present, no inflammatory lesions present, no masses present, no atrophy present, +slight tenderness right anterior mons/inguinal region, ?subtle thickening of tissue at this location, but no discrete masses or inguinal lymphadenopathy appreciated, no palpable hernia  · Vagina: normal vaginal vault without central or paravaginal defects, no discharge present, no inflammatory lesions present, no masses present  · Bladder: non-tender to palpation  · Urethra: appears normal  · Cervix: absent   · Uterus: absent  · Adnexa: no adnexal tenderness present, no adnexal masses appreciated  · Perineum: perineum within normal limits, no evidence of trauma, no rashes or skin lesions present    Skin  · General Inspection: no rash, no lesions identified    Neurologic/Psychiatric  · Mental Status:  · Orientation: grossly oriented to person, place and time  · Mood and Affect: mood normal, affect appropriate      Assessment/Plan:  56 y.o. postmenopausal female with right inguinal/mons pain and thickening and associated wt loss.      -referral for pelvic US  -referral for mammogram  -pap no longer indicated  -advised pt schedule colonoscopy  -given h/o inguinal hernia with mesh (requiring subsequent mesh removal), consider referral to gen surg if pain persistent and if gyn US wnl    RTC: 1 wk for US or sooner edie Langston MD  6/12/2020  3:26 PM

## 2020-06-26 ENCOUNTER — OFFICE VISIT (OUTPATIENT)
Dept: OBGYN CLINIC | Age: 60
End: 2020-06-26

## 2020-06-26 VITALS — SYSTOLIC BLOOD PRESSURE: 142 MMHG | BODY MASS INDEX: 25.85 KG/M2 | WEIGHT: 170 LBS | DIASTOLIC BLOOD PRESSURE: 84 MMHG

## 2020-06-26 DIAGNOSIS — R10.2 PELVIC PAIN IN FEMALE: Primary | ICD-10-CM

## 2020-06-26 DIAGNOSIS — R10.31 INGUINAL PAIN, RIGHT: ICD-10-CM

## 2020-06-26 NOTE — PROGRESS NOTES
Problem Visit    Yovani Mackenzie is a 56 y.o. postmenopausal female presenting for follow up of pelvic pain. Her main concern today is pain and nodularity on right mons/groin. Pt is s/p hysterectomy, but thinks she still has at least one ovary, unsure if still has a cervix. Recent weight loss related to increased exercise and dietary changes. Also with vaginal dryness. TRANSVAGINAL ULTRASOUND PERFORMED   THE UTERUS IS SURGICALLY REMOVED PER PATIENT. THE CERVIX APPEARS SURGICALLY REMOVED. THE RIGHT OVARY IS NOT VISUALIZED DUE TO INCREASED BOWEL GAS. RIGHT ADNEXA APPEARS WNL. THE LEFT OVARY APPEARS WNL. NO FREE FLUID SEEN IN THE CDS. Ob/Gyn Hx:  G1  S/p hyst     Health maintenance:  Pap- no longer indicated, no cervix, and no abnormal paps in > 20 years  Mammo-2019  Colonoscopy-     Past Medical History:   Diagnosis Date    Diabetes [facility])        Past Surgical History:   Procedure Laterality Date    HX HERNIA REPAIR      HX HYSTERECTOMY      lots of scar tissue noted    HX SALPINECTOMY       ruptured ectopic    HX TUBAL LIGATION         History reviewed. No pertinent family history.     Social History     Socioeconomic History    Marital status:      Spouse name: Not on file    Number of children: Not on file    Years of education: Not on file    Highest education level: Not on file   Occupational History    Not on file   Social Needs    Financial resource strain: Not on file    Food insecurity     Worry: Not on file     Inability: Not on file    Transportation needs     Medical: Not on file     Non-medical: Not on file   Tobacco Use    Smoking status: Former Smoker     Last attempt to quit: 2000     Years since quittin.0    Smokeless tobacco: Never Used   Substance and Sexual Activity    Alcohol use: Not Currently    Drug use: Never    Sexual activity: Yes     Partners: Male     Birth control/protection: None   Lifestyle    Physical activity     Days per week: Not on file     Minutes per session: Not on file    Stress: Not on file   Relationships    Social connections     Talks on phone: Not on file     Gets together: Not on file     Attends Moravian service: Not on file     Active member of club or organization: Not on file     Attends meetings of clubs or organizations: Not on file     Relationship status: Not on file    Intimate partner violence     Fear of current or ex partner: Not on file     Emotionally abused: Not on file     Physically abused: Not on file     Forced sexual activity: Not on file   Other Topics Concern    Not on file   Social History Narrative    Not on file       Current Outpatient Medications   Medication Sig Dispense Refill    metformin HCl (METFORMIN PO) Take  by mouth.  MULTIVITAMIN WITH FOLIC ACID PO Take  by mouth.  ascorbic acid (VITAMIN C PO) Take  by mouth.  GARLIC PO Take  by mouth.  MAGNESIUM PO Take  by mouth.          Allergies   Allergen Reactions    Fioricet [Butalbital-Acetaminophen-Caff] Hives    Shellfish Derived Hives       Review of Systems - History obtained from the patient  Constitutional: negative for weight loss, fever, night sweats  HEENT: negative for hearing loss, earache, congestion, snoring, sorethroat  CV: negative for chest pain, palpitations, edema  Resp: negative for cough, shortness of breath, wheezing  GI: negative for change in bowel habits, abdominal pain, black or bloody stools  : negative for frequency, dysuria, hematuria, vaginal discharge  MSK: negative for back pain, joint pain, muscle pain  Breast: negative for breast lumps, nipple discharge, galactorrhea  Skin :negative for itching, rash, hives  Neuro: negative for dizziness, headache, confusion, weakness  Psych: negative for anxiety, depression, change in mood  Heme/lymph: negative for bleeding, bruising, pallor    Physical Exam    Visit Vitals  /84 (BP 1 Location: Right arm, BP Patient Position: Sitting)   Wt 170 lb (77.1 kg)   BMI 25.85 kg/m²       Constitutional  · Appearance: well-nourished, well developed, alert, in no acute distress    HENT  · Head and Face: appears normal    Neck  · Inspection/Palpation: normal appearance, no masses or tenderness  · Lymph Nodes: no lymphadenopathy present  · Thyroid: gland size normal, nontender, no nodules or masses present on palpation    Chest  · Respiratory Effort: non-labored breathing  · Auscultation: CTAB, normal breath sounds    Cardiovascular  · Heart:  · Auscultation: regular rate and rhythm without murmur  · Extremities: no peripheral edema    Gastrointestinal  · Abdominal Examination: abdomen non-tender to palpation, normal bowel sounds, no masses present  · Liver and spleen: no hepatomegaly present, spleen not palpable  · Hernias: no hernias identified    Skin  · General Inspection: no rash, no lesions identified    Neurologic/Psychiatric  · Mental Status:  · Orientation: grossly oriented to person, place and time  · Mood and Affect: mood normal, affect appropriate      Assessment/Plan:  56 y.o. postmenopausal female with right inguinal/mons pain. Possibly related to scar tissue from prior hernia repair with mesh.  TVUS today showing normal left ovary, all other gynecologic structures surgically absent.     -reviewed TVUS findings with pt today, reassurance provided  -advised follow up with gen surg for 2nd opinion if mons/inguinal pain persists    RTC: 1 year for AE or sooner edie Dick  6/26/2020  11:08 AM

## 2020-06-26 NOTE — PATIENT INSTRUCTIONS
Pelvic Pain: Care Instructions  Your Care Instructions     Pelvic pain, or pain in the lower belly, can have many causes. Often pelvic pain is not serious and gets better in a few days. If your pain continues or gets worse, you may need tests and treatment. Tell your doctor about any new symptoms. These may be signs of a serious problem. Follow-up care is a key part of your treatment and safety. Be sure to make and go to all appointments, and call your doctor if you are having problems. It's also a good idea to know your test results and keep a list of the medicines you take. How can you care for yourself at home? · Rest until you feel better. Lie down, and raise your legs by placing a pillow under your knees. · Drink plenty of fluids. You may find that small, frequent sips are easier on your stomach than if you drink a lot at once. Avoid drinks with carbonation or caffeine, such as soda pop, tea, or coffee. · Try eating several small meals instead of 2 or 3 large ones. Eat mild foods, such as rice, dry toast or crackers, bananas, and applesauce. Avoid fatty and spicy foods, other fruits, and alcohol until 48 hours after your symptoms have gone away. · Take an over-the-counter pain medicine, such as acetaminophen (Tylenol), ibuprofen (Advil, Motrin), or naproxen (Aleve). Read and follow all instructions on the label. · Do not take two or more pain medicines at the same time unless the doctor told you to. Many pain medicines have acetaminophen, which is Tylenol. Too much acetaminophen (Tylenol) can be harmful. · You can put a heating pad, a warm cloth, or moist heat on your belly to relieve pain. When should you call for help? Call your doctor now or seek immediate medical care if:  · You have a new or higher fever. · You have unusual vaginal bleeding. · You have new or worse belly or pelvic pain. · You have vaginal discharge that has increased in amount or smells bad.   Watch closely for changes in your health, and be sure to contact your doctor if:  · You do not get better as expected. Where can you learn more? Go to http://alysa-raquel.info/  Enter B514 in the search box to learn more about \"Pelvic Pain: Care Instructions. \"  Current as of: November 8, 2019               Content Version: 12.5  © 5903-0730 [employer]. Care instructions adapted under license by [employer] (which disclaims liability or warranty for this information). If you have questions about a medical condition or this instruction, always ask your healthcare professional. [address] any warranty or liability for your use of this information.

## 2020-07-15 ENCOUNTER — OFFICE VISIT (OUTPATIENT)
Dept: CARDIOLOGY CLINIC | Age: 60
End: 2020-07-15

## 2020-07-15 ENCOUNTER — HOSPITAL ENCOUNTER (OUTPATIENT)
Dept: LAB | Age: 60
Discharge: HOME | End: 2020-07-15

## 2020-07-15 VITALS
BODY MASS INDEX: 25.09 KG/M2 | SYSTOLIC BLOOD PRESSURE: 138 MMHG | OXYGEN SATURATION: 99 % | RESPIRATION RATE: 16 BRPM | WEIGHT: 165 LBS | DIASTOLIC BLOOD PRESSURE: 80 MMHG | HEART RATE: 73 BPM

## 2020-07-15 DIAGNOSIS — E78.5 DYSLIPIDEMIA: ICD-10-CM

## 2020-07-15 DIAGNOSIS — E11.9 TYPE 2 DIABETES MELLITUS WITHOUT COMPLICATION, WITHOUT LONG-TERM CURRENT USE OF INSULIN (HCC): ICD-10-CM

## 2020-07-15 DIAGNOSIS — R07.9 CHEST PAIN, UNSPECIFIED TYPE: Primary | ICD-10-CM

## 2020-07-15 LAB
EST. AVERAGE GLUCOSE BLD GHB EST-MCNC: 126 MG/DL
HBA1C MFR BLD: 6 % (ref 4–5.6)
TSH SERPL DL<=0.05 MIU/L-ACNC: 0.88 UIU/ML (ref 0.36–3.74)

## 2020-07-15 NOTE — PROGRESS NOTES
Matti Caballero MD. [facility]              Patient: Ale Dougherty  : 1960      Today's Date: 7/15/2020          HISTORY OF PRESENT ILLNESS:     History of Present Illness:    She went to [facility] for chest pain -    Records reviewed. She went to ED with atypical CP 20 -   EKG 20 - NSR, LVH, cannot rule out septal infarct  Labs 20 - BNP 16, trop negative, BMP OK  CBC OK   CXR OK     She thinks problems may have been due to a discontinued hand . Since then she has been OK. One day she had high BP but BP usually is OK. Every now and then she has a little twitch. She has lost 50 lbs - she is eating healthy and walking 6 miles a day. PAST MEDICAL HISTORY:     Past Medical History:   Diagnosis Date    Costalchondritis     Degenerative disc disease     Diabetes ([address]     [facility] [address] [facility]     Ectopic pregnancy     Fibromyalgia     History of lower GI bleeding 2017    Hypercholesterolemia 2018    Peptic ulcer disease 2017    Type 2 diabetes mellitus without complication (HCC)     Vitamin D deficiency      Past Surgical History:   Procedure Laterality Date    HX GYN      bilateral fallopian tube removal    HX HERNIA REPAIR      HX HYSTERECTOMY      HX HYSTERECTOMY      lots of scar tissue noted    HX OTHER SURGICAL      both fallopian tubes removed    HX SALPINECTOMY       ruptured ectopic    HX TUBAL LIGATION             MEDICATIONS:     Current Outpatient Medications   Medication Sig Dispense Refill    MULTIVITAMIN WITH FOLIC ACID PO Take  by mouth.  ascorbic acid (VITAMIN C PO) Take  by mouth.  GARLIC PO Take  by mouth.  metFORMIN ER (GLUCOPHAGE XR) 500 mg tablet TAKE 2 TABLETS BY MOUTH BEFORE BREAKFAST AND DINNER 60 Tab 0    glucose blood VI test strips (ACCU-CHEK SMARTVIEW TEST STRIP) strip Test 2 times daily, Dx.  Code 250.00 200 Strip 4    pantoprazole (PROTONIX) 40 mg tablet Take 40 mg by mouth daily.        metformin HCl (METFORMIN PO) Take  by mouth.  MAGNESIUM PO Take  by mouth.  multivitamin (ONE A DAY) tablet Take 1 Tab by mouth daily.  cetirizine (ZYRTEC) 10 mg tablet Take  by mouth as needed. Allergies   Allergen Reactions    Latex Itching    Fioricet [Butalbital-Acetaminophen-Caff] Anaphylaxis and Hives    Fiorinal [Butalbital-Aspirin-Caffeine] Anaphylaxis and Hives    Shellfish Derived Hives    Fioricet [Butalbital-Acetaminophen-Caff] Hives    Shellfish Derived Hives           SOCIAL HISTORY:     Social History     Tobacco Use    Smoking status: Former Smoker     Packs/day: 2.00     Years: 15.00     Pack years: 30.00     Last attempt to quit: 2000     Years since quittin.1    Smokeless tobacco: Never Used   Substance Use Topics    Alcohol use: Not Currently    Drug use: Never         FAMILY HISTORY:     Family History   Problem Relation Age of Onset    Hypertension Mother     Elevated Lipids Mother     Other Mother         brain aneurysm    Hypertension Father     COPD Father     Other Sister         brain aneurysm    No Known Problems Brother     No Known Problems Brother     No Known Problems Brother     No Known Problems Brother     Other Brother         hep C    Substance Abuse Brother     Substance Abuse Brother     Other Brother         hep C    Diabetes Brother     Hypertension Brother     Elevated Lipids Brother     No Known Problems Brother     Cancer Sister         throat cancer    Diabetes Sister     Heart Disease Sister 50    Arthritis-osteo Sister     No Known Problems Son             REVIEW OF SYMPTOMS:      Review of Symptoms:  Constitutional: Negative for fever, chills  HEENT: Negative for nosebleeds, tinnitus, and vision changes. Respiratory: Negative for cough, wheezing  Cardiovascular: Negative for orthopnea, claudication, syncope, and PND. Gastrointestinal: Negative for abdominal pain, diarrhea, melena. Genitourinary: Negative for dysuria  Musculoskeletal: Negative for myalgias. Skin: Negative for rash  Heme: No problems bleeding. Neurological: Negative for speech change and focal weakness.    + constipation            PHYSICAL EXAM:      Physical Exam:  Visit Vitals  /80 (BP 1 Location: Left arm, BP Patient Position: Sitting)   Pulse 73   Resp 16   Wt 165 lb (74.8 kg)   LMP  (LMP Unknown)   SpO2 99%   BMI 25.09 kg/m²       Patient appears generally well, mood and affect are appropriate and pleasant. HEENT:  Hearing intact, non-icteric, normocephalic, atraumatic. Neck Exam: Supple, No JVD or carotid bruits. Lung Exam: Clear to auscultation, even breath sounds. Cardiac Exam: Regular rate and rhythm with no murmur  Abdomen: Soft, non-tender, normal bowel sounds. No bruits or masses. Extremities: Moves all ext well. No lower extremity edema. Vascular: 2+ dorsalis pedis pulses bilaterally. Psych: Appropriate affect  Neuro - Grossly intact           LABS / OTHER STUDIES:         Lab Results   Component Value Date/Time    Sodium 141 05/19/2018 12:46 PM    Potassium 3.7 05/19/2018 12:46 PM    Chloride 106 05/19/2018 12:46 PM    CO2 24 05/19/2018 12:46 PM    Anion gap 11 05/19/2018 12:46 PM    Glucose 147 (H) 05/19/2018 12:46 PM    BUN 21 (H) 05/19/2018 12:46 PM    Creatinine 0.97 05/19/2018 12:46 PM    BUN/Creatinine ratio 22 (H) 05/19/2018 12:46 PM    GFR est AA >60 05/19/2018 12:46 PM    GFR est non-AA 59 (L) 05/19/2018 12:46 PM    Calcium 9.3 05/19/2018 12:46 PM    Bilirubin, total 0.5 05/19/2018 12:46 PM    Alk.  phosphatase 80 05/19/2018 12:46 PM    Protein, total 7.6 05/19/2018 12:46 PM    Albumin 3.7 05/19/2018 12:46 PM    Globulin 3.9 05/19/2018 12:46 PM    A-G Ratio 0.9 (L) 05/19/2018 12:46 PM    ALT (SGPT) 21 05/19/2018 12:46 PM    AST (SGOT) 17 05/19/2018 12:46 PM     Lab Results   Component Value Date/Time    WBC 6.7 05/19/2018 12:46 PM    HGB 12.5 05/19/2018 12:46 PM    HCT 38.7 05/19/2018 12:46 PM    PLATELET 810 51/43/3918 12:46 PM    MCV 89.2 05/19/2018 12:46 PM             CARDIAC DIAGNOSTICS:      Cardiac Evaluation Includes:     EKG 5/19/18- NSR, borderline LVH     CXR 5/19/18 - No acute process     Treadmill Stress test 5/21/18 - EKG with NSR and LVH. Walked 4 min (7.0 METS). No CP and normal stress EKG.     Echo 5/21/18 - LVEF 60 % to 65 %. Grade 1 diastolic dysfunction. RV Normal AV sclerosis.         Records reviewed. She went to ED with atypical CP 5/7/20 -\   EKG 5/7/20 - NSR, LVH, cannot rule out septal infarct  Labs 5/7/20 - BNP 16, trop negative, BMP OK  CBC OK   CXR OK              ASSESSMENT AND PLAN:      Assessment and Plan:  1) Atypical chest pain recently   - See above [facility] notes  - Will check a treadmill stress test     2) LVH on EKG   - check an echo to assess heart structure     3) CV risk management / Dyslipidemia   - she has managed DM and Dyslipidemia through diet and exercise   - check lipids   - info given on a CT heart scan      4) HTN  - BP high at times  - keep log of BP BID for now     5) Obesity   - lost 50 lbs with diet and exercise      6) See me in one month. Patient expressed understanding of the plan - questions were answered. 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 [address] [place] [address]  Ph: [phone]                               Ph [phone]       ADDENDUM   7/17/2020  Echo 7/15/20 - LVEF 60-65%    Treadmill Stress Test 7/15/20 - walked 7 min (10.1 METS), normal study     Lipids and A1c look better. There is no LVH on echo.      Will have nurse call with normal echo and stress test findings.

## 2020-07-15 NOTE — PROGRESS NOTES
Orders for stress test, echo today per Dr. Flory RODRIGUEZ. Dx: cp  Patient stated that she just had a covid test that was resulted this past Sunday; results were negative.

## 2020-07-16 LAB
CHOLEST SERPL-MCNC: 178 MG/DL (ref 100–199)
HDL SERPL-SCNC: 32.4 UMOL/L
HDLC SERPL-MCNC: 64 MG/DL
LDL SERPL QN: 20.5 NM
LDL SERPL-SCNC: 1270 NMOL/L
LDL SMALL SERPL-SCNC: 550 NMOL/L
LDLC SERPL CALC-MCNC: 105 MG/DL (ref 0–99)
LP-IR SCORE SERPL: 26
TRIGL SERPL-MCNC: 47 MG/DL (ref 0–149)

## 2020-07-17 ENCOUNTER — TELEPHONE (OUTPATIENT)
Dept: CARDIOLOGY CLINIC | Age: 60
End: 2020-07-17

## 2020-07-17 NOTE — TELEPHONE ENCOUNTER
Spoke to pt,  Per Dr. Beba Perez: Gene Copper 7/15/20 - LVEF 60-65%     Treadmill Stress Test 7/15/20 - walked 7 min (10.1 METS), normal study     Lipids and A1c look better (letter to be sent)     There is no LVH on echo. Will have nurse call with normal echo and stress test findings. \"

## 2020-09-11 ENCOUNTER — OFFICE VISIT (OUTPATIENT)
Dept: URGENT CARE | Age: 60
End: 2020-09-11
Payer: COMMERCIAL

## 2020-09-11 VITALS — OXYGEN SATURATION: 99 % | HEART RATE: 74 BPM | TEMPERATURE: 98.4 F | RESPIRATION RATE: 16 BRPM

## 2020-09-11 DIAGNOSIS — R05.9 COUGH: ICD-10-CM

## 2020-09-11 DIAGNOSIS — R07.0 THROAT PAIN: ICD-10-CM

## 2020-09-11 DIAGNOSIS — Z20.822 EXPOSURE TO COVID-19 VIRUS: Primary | ICD-10-CM

## 2020-09-11 PROCEDURE — S9083 URGENT CARE CENTER GLOBAL: HCPCS | Performed by: NURSE PRACTITIONER

## 2020-09-11 NOTE — PROGRESS NOTES
55 yo F with a hx of DM, DDD, fibromyalgia (see list)  presents to the  for evaluation via car for COVID exposure a week ago (family member) with 4-5 day hx of scratchy throat, occasional dry cough and HA. She denies fever, chills, CP, SOB, dizziness or loss of taste/smell. Tx PTA includes Zyrtec. She is a nonsmoker. Past Medical History:  No date: Costalchondritis  No date: Degenerative disc disease  No date: [facility] [address]  No date: DM (diabetes mellitus) [facility] [address]  No date: Ectopic pregnancy  No date: Fibromyalgia  2017:  History of lower GI bleeding  2018: Hypercholesterolemia  2017: Peptic ulcer disease  No date: Type 2 diabetes mellitus without complication (HCC)  No date: Vitamin D deficiency    Social History    Socioeconomic History      Marital status:       Spouse name: Not on file      Number of children: Not on file      Years of education: Not on file      Highest education level: Not on file    Occupational History      Not on file    Social Needs      Financial resource strain: Not on file      Food insecurity        Worry: Not on file        Inability: Not on file      Transportation needs        Medical: Not on file        Non-medical: Not on file    Tobacco Use      Smoking status: Former Smoker        Packs/day: 2.00        Years: 15.00        Pack years: 27        Quit date: 2000        Years since quittin.2      Smokeless tobacco: Never Used    Substance and Sexual Activity      Alcohol use: Not Currently      Drug use: Never      Sexual activity: Yes        Partners: Male        Birth control/protection: None    Lifestyle      Physical activity        Days per week: Not on file        Minutes per session: Not on file      Stress: Not on file    Relationships      Social connections        Talks on phone: Not on file        Gets together: Not on file        Attends Pentecostal service: Not on file        Active member of club or organization: Not on file Attends meetings of clubs or organizations: Not on file        Relationship status: Not on file      Intimate partner violence        Fear of current or ex partner: Not on file        Emotionally abused: Not on file        Physically abused: Not on file        Forced sexual activity: Not on file    Other Topics      Concerns:        Not on file    Social History Narrative      ** Merged History Encounter **             The history is provided by the patient. No  was used.         Past Medical History:   Diagnosis Date    Costalchondritis     Degenerative disc disease     [facility] [facility]     DM (diabetes mellitus) ([facility] [facility]     Ectopic pregnancy     Fibromyalgia     History of lower GI bleeding 2/16/2017    Hypercholesterolemia 1/31/2018    Peptic ulcer disease 2/16/2017    Type 2 diabetes mellitus without complication (HCC)     Vitamin D deficiency         Past Surgical History:   Procedure Laterality Date    HX GYN      bilateral fallopian tube removal    HX HERNIA REPAIR      HX HYSTERECTOMY      HX HYSTERECTOMY      lots of scar tissue noted    HX OTHER SURGICAL      both fallopian tubes removed    HX SALPINECTOMY       ruptured ectopic    HX TUBAL LIGATION           Family History   Problem Relation Age of Onset    Hypertension Mother     Elevated Lipids Mother     Other Mother         brain aneurysm    Hypertension Father     COPD Father     Other Sister         brain aneurysm    No Known Problems Brother     No Known Problems Brother     No Known Problems Brother     No Known Problems Brother     Other Brother         hep C    Substance Abuse Brother     Substance Abuse Brother     Other Brother         hep C    Diabetes Brother     Hypertension Brother     Elevated Lipids Brother     No Known Problems Brother     Cancer Sister         throat cancer    Diabetes Sister     Heart Disease Sister 50    Arthritis-osteo Sister     No Known Problems Son Social History     Socioeconomic History    Marital status:      Spouse name: Not on file    Number of children: Not on file    Years of education: Not on file    Highest education level: Not on file   Occupational History    Not on file   Social Needs    Financial resource strain: Not on file    Food insecurity     Worry: Not on file     Inability: Not on file    Transportation needs     Medical: Not on file     Non-medical: Not on file   Tobacco Use    Smoking status: Former Smoker     Packs/day: 2.00     Years: 15.00     Pack years: 30.00     Last attempt to quit: 2000     Years since quittin.2    Smokeless tobacco: Never Used   Substance and Sexual Activity    Alcohol use: Not Currently    Drug use: Never    Sexual activity: Yes     Partners: Male     Birth control/protection: None   Lifestyle    Physical activity     Days per week: Not on file     Minutes per session: Not on file    Stress: Not on file   Relationships    Social connections     Talks on phone: Not on file     Gets together: Not on file     Attends Hinduism service: Not on file     Active member of club or organization: Not on file     Attends meetings of clubs or organizations: Not on file     Relationship status: Not on file    Intimate partner violence     Fear of current or ex partner: Not on file     Emotionally abused: Not on file     Physically abused: Not on file     Forced sexual activity: Not on file   Other Topics Concern    Not on file   Social History Narrative    ** Merged History Encounter **                     ALLERGIES: Latex; Fioricet [butalbital-acetaminophen-caff]; Fiorinal [butalbital-aspirin-caffeine]; Shellfish derived; Fioricet [butalbital-acetaminophen-caff]; and Shellfish derived    Review of Systems   Constitutional: Negative for chills and fever. HENT: Negative for congestion, ear pain, facial swelling, sore throat and trouble swallowing.          Scratchy throat, HA, dry cough Eyes: Negative for discharge and redness. Respiratory: Negative for cough, chest tightness, shortness of breath and wheezing. Cardiovascular: Negative for chest pain, palpitations and leg swelling. Gastrointestinal: Negative for diarrhea, nausea and vomiting. Musculoskeletal: Negative for myalgias. Skin: Negative for color change. Neurological: Positive for headaches. Negative for seizures and facial asymmetry. Psychiatric/Behavioral: Negative for confusion. Vitals:    09/11/20 1103   Pulse: 74   Resp: 16   Temp: 98.4 °F (36.9 °C)   SpO2: 99%       Physical Exam  Vitals signs and nursing note reviewed. Constitutional:       General: She is not in acute distress. Appearance: Normal appearance. She is not ill-appearing or toxic-appearing. HENT:      Nose: Nose normal.   Cardiovascular:      Rate and Rhythm: Normal rate. Pulmonary:      Effort: Pulmonary effort is normal.      Breath sounds: Normal breath sounds. Neurological:      Mental Status: She is alert. MDM       ICD-10-CM ICD-9-CM    1. Exposure to COVID-19 virus  Z20.828 V01.79 NOVEL CORONAVIRUS (COVID-19)     No orders of the defined types were placed in this encounter. No results found for any visits on 09/11/20. The patients condition was discussed with the patient and they understand. The patient is to follow up with primary care doctor. If signs and symptoms become worse the pt is to go to the ER. The patient is to take medications as prescribed. COVID testing ordered. Pt advised to self quarantine per CDC guidelines.      Procedures

## 2020-09-13 LAB — SARS-COV-2, NAA: NOT DETECTED

## 2021-02-16 ENCOUNTER — OFFICE VISIT (OUTPATIENT)
Dept: URGENT CARE | Age: 61
End: 2021-02-16
Payer: COMMERCIAL

## 2021-02-16 VITALS — RESPIRATION RATE: 17 BRPM | TEMPERATURE: 97.8 F | OXYGEN SATURATION: 97 % | HEART RATE: 67 BPM

## 2021-02-16 DIAGNOSIS — Z20.822 EXPOSURE TO COVID-19 VIRUS: Primary | ICD-10-CM

## 2021-02-16 DIAGNOSIS — R05.9 COUGH: ICD-10-CM

## 2021-02-16 DIAGNOSIS — R53.83 FATIGUE, UNSPECIFIED TYPE: ICD-10-CM

## 2021-02-16 LAB — SARS-COV-2 POC: NEGATIVE

## 2021-02-16 PROCEDURE — 99213 OFFICE O/P EST LOW 20 MIN: CPT | Performed by: FAMILY MEDICINE

## 2021-02-16 PROCEDURE — 87426 SARSCOV CORONAVIRUS AG IA: CPT | Performed by: FAMILY MEDICINE

## 2021-02-16 NOTE — PROGRESS NOTES
This patient was seen at [address] [place] [address] [facility] while in their vehicle due to COVID-19 pandemic with PPE and focused examination in order to decrease community viral transmission. The patient/guardian gave verbal consent to treat. Marisela Zaragoza is a 56 y.o. female who presents with fatigue and intermittent slight cough x 1 week. Was exposed to COVID-19 by family member. Denies fever, SOB. The history is provided by the patient.         Past Medical History:   Diagnosis Date    Costalchondritis     Degenerative disc disease     [facility] [address] [facility]     DM (diabetes mellitus) [facility] [address] [facility]     Ectopic pregnancy     Fibromyalgia     History of lower GI bleeding 2/16/2017    Hypercholesterolemia 1/31/2018    Peptic ulcer disease 2/16/2017    Type 2 diabetes mellitus without complication (HCC)     Vitamin D deficiency         Past Surgical History:   Procedure Laterality Date    HX GYN      bilateral fallopian tube removal    HX HERNIA REPAIR      HX HYSTERECTOMY      HX HYSTERECTOMY      lots of scar tissue noted    HX OTHER SURGICAL      both fallopian tubes removed    HX SALPINECTOMY       ruptured ectopic    HX TUBAL LIGATION           Family History   Problem Relation Age of Onset    Hypertension Mother     Elevated Lipids Mother     Other Mother         brain aneurysm    Hypertension Father     COPD Father     Other Sister         brain aneurysm    No Known Problems Brother     No Known Problems Brother     No Known Problems Brother     No Known Problems Brother     Other Brother         hep C    Substance Abuse Brother     Substance Abuse Brother     Other Brother         hep C    Diabetes Brother     Hypertension Brother     Elevated Lipids Brother     No Known Problems Brother     Cancer Sister         throat cancer    Diabetes Sister     Heart Disease Sister 50    Arthritis-osteo Sister     No Known Problems Son         Social History     Socioeconomic History  Marital status:      Spouse name: Not on file    Number of children: Not on file    Years of education: Not on file    Highest education level: Not on file   Occupational History    Not on file   Social Needs    Financial resource strain: Not on file    Food insecurity     Worry: Not on file     Inability: Not on file    Transportation needs     Medical: Not on file     Non-medical: Not on file   Tobacco Use    Smoking status: Former Smoker     Packs/day: 2.00     Years: 15.00     Pack years: 30.00     Quit date: 2000     Years since quittin.6    Smokeless tobacco: Never Used   Substance and Sexual Activity    Alcohol use: Not Currently    Drug use: Never    Sexual activity: Yes     Partners: Male     Birth control/protection: None   Lifestyle    Physical activity     Days per week: Not on file     Minutes per session: Not on file    Stress: Not on file   Relationships    Social connections     Talks on phone: Not on file     Gets together: Not on file     Attends Confucianist service: Not on file     Active member of club or organization: Not on file     Attends meetings of clubs or organizations: Not on file     Relationship status: Not on file    Intimate partner violence     Fear of current or ex partner: Not on file     Emotionally abused: Not on file     Physically abused: Not on file     Forced sexual activity: Not on file   Other Topics Concern    Not on file   Social History Narrative    ** Merged History Encounter **                     ALLERGIES: Latex, Fioricet [butalbital-acetaminophen-caff], Fiorinal [butalbital-aspirin-caffeine], Shellfish derived, Fioricet [butalbital-acetaminophen-caff], and Shellfish derived    Review of Systems   Constitutional: Positive for fatigue. Negative for activity change, appetite change, chills and fever. HENT: Negative for congestion, rhinorrhea and sore throat. Respiratory: Positive for cough.  Negative for shortness of breath and wheezing. Cardiovascular: Negative for chest pain. Gastrointestinal: Negative for abdominal pain, diarrhea, nausea and vomiting. Musculoskeletal: Negative for myalgias. Neurological: Negative for headaches. Vitals:    02/16/21 1246   Pulse: 67   Resp: 17   Temp: 97.8 °F (36.6 °C)   SpO2: 97%       Physical Exam  Vitals signs and nursing note reviewed. Constitutional:       General: She is not in acute distress. Appearance: She is well-developed. She is not diaphoretic. Pulmonary:      Effort: Pulmonary effort is normal. No respiratory distress. Breath sounds: Normal breath sounds. No stridor. No wheezing, rhonchi or rales. Neurological:      Mental Status: She is alert. Psychiatric:         Behavior: Behavior normal.         Thought Content: Thought content normal.         Judgment: Judgment normal.         MDM    ICD-10-CM ICD-9-CM   1. Exposure to COVID-19 virus  Z20.822 V01.79   2. Fatigue, unspecified type  R53.83 780.79   3. Cough  R05 786.2       Orders Placed This Encounter    NOVEL CORONAVIRUS (COVID-19)     Scheduling Instructions:      1) Due to current limited availability of the COVID-19 PCR test, tests will be prioritized and may not be completed.              2) Order only if the test result will change clinical management or necessary for a return to mission-critical employment decision.              3) Print and instruct patient to adhere to CDC home isolation program. (Link Above)              4) Set up or refer patient for a monitoring program.              5) Have patient sign up for and leverage MyChart (if not previously done). Order Specific Question:   Is this test for diagnosis or screening? Answer:   Diagnosis of ill patient     Order Specific Question:   Symptomatic for COVID-19 as defined by CDC? Answer:   Yes     Order Specific Question:   Date of Symptom Onset     Answer:   2/9/2021     Order Specific Question:   Hospitalized for COVID-19? Answer:   No     Order Specific Question:   Admitted to ICU for COVID-19? Answer:   No     Order Specific Question:   Employed in healthcare setting? Answer:   No     Order Specific Question:   Resident in a congregate (group) care setting? Answer:   No     Order Specific Question:   Pregnant? Answer:   No     Order Specific Question:   Previously tested for COVID-19? Answer: Yes    AMB POC SARS-COV-2     Order Specific Question:   Is this test for diagnosis or screening? Answer:   Diagnosis of ill patient     Order Specific Question:   Symptomatic for COVID-19 as defined by CDC? Answer:   Yes     Order Specific Question:   Date of Symptom Onset     Answer:   2/9/2021     Order Specific Question:   Hospitalized for COVID-19? Answer:   No     Order Specific Question:   Admitted to ICU for COVID-19? Answer:   No     Order Specific Question:   Employed in healthcare setting? Answer:   No     Order Specific Question:   Resident in a congregate (group) care setting? Answer:   No     Order Specific Question:   Pregnant? Answer:   No     Order Specific Question:   Previously tested for COVID-19? Answer:   Yes        Quarantine  Deep breathing exercises, ambulation  Tylenol prn  Increase fluids with electrolytes    If signs and symptoms become worse the pt is to go to the ER.      Results for orders placed or performed in visit on 02/16/21   AMB POC SARS-COV-2   Result Value Ref Range    SARS-COV-2 POC Negative Negative       Procedures

## 2021-02-17 LAB — SARS-COV-2, NAA: NOT DETECTED

## 2022-03-18 PROBLEM — E78.00 HYPERCHOLESTEROLEMIA: Status: ACTIVE | Noted: 2018-01-31

## 2022-03-19 PROBLEM — K27.9 PEPTIC ULCER DISEASE: Status: ACTIVE | Noted: 2017-02-16

## 2022-03-20 PROBLEM — Z87.19 HISTORY OF LOWER GI BLEEDING: Status: ACTIVE | Noted: 2017-02-16

## 2023-05-17 RX ORDER — METFORMIN HYDROCHLORIDE 500 MG/1
TABLET, EXTENDED RELEASE ORAL
COMMUNITY
Start: 2019-06-16

## 2023-05-17 RX ORDER — PANTOPRAZOLE SODIUM 40 MG/1
40 TABLET, DELAYED RELEASE ORAL DAILY
COMMUNITY

## 2024-07-06 ENCOUNTER — APPOINTMENT (OUTPATIENT)
Facility: HOSPITAL | Age: 64
End: 2024-07-06
Payer: COMMERCIAL

## 2024-07-06 ENCOUNTER — HOSPITAL ENCOUNTER (EMERGENCY)
Facility: HOSPITAL | Age: 64
Discharge: HOME | End: 2024-07-06
Attending: EMERGENCY MEDICINE
Payer: COMMERCIAL

## 2024-07-06 VITALS
HEART RATE: 89 BPM | OXYGEN SATURATION: 99 % | RESPIRATION RATE: 20 BRPM | SYSTOLIC BLOOD PRESSURE: 160 MMHG | TEMPERATURE: 98 F | DIASTOLIC BLOOD PRESSURE: 93 MMHG

## 2024-07-06 DIAGNOSIS — R07.89 LEFT-SIDED CHEST WALL PAIN: Primary | ICD-10-CM

## 2024-07-06 DIAGNOSIS — R05.1 ACUTE COUGH: ICD-10-CM

## 2024-07-06 LAB
ALBUMIN SERPL-MCNC: 3.7 G/DL (ref 3.5–5)
ALBUMIN/GLOB SERPL: 1.1 (ref 1.1–2.2)
ALP SERPL-CCNC: 63 U/L (ref 45–117)
ALT SERPL-CCNC: 18 U/L (ref 12–78)
ANION GAP SERPL CALC-SCNC: 7 MMOL/L (ref 5–15)
AST SERPL-CCNC: 18 U/L (ref 15–37)
BASOPHILS # BLD: 0.1 K/UL (ref 0–0.1)
BASOPHILS NFR BLD: 1 % (ref 0–1)
BILIRUB SERPL-MCNC: 0.7 MG/DL (ref 0.2–1)
BUN SERPL-MCNC: 24 MG/DL (ref 6–20)
BUN/CREAT SERPL: 22 (ref 12–20)
CALCIUM SERPL-MCNC: 9.1 MG/DL (ref 8.5–10.1)
CHLORIDE SERPL-SCNC: 108 MMOL/L (ref 97–108)
CO2 SERPL-SCNC: 26 MMOL/L (ref 21–32)
COMMENT:: NORMAL
CREAT SERPL-MCNC: 1.1 MG/DL (ref 0.55–1.02)
D DIMER PPP FEU-MCNC: 0.34 MG/L FEU (ref 0–0.65)
DIFFERENTIAL METHOD BLD: NORMAL
EOSINOPHIL # BLD: 0.2 K/UL (ref 0–0.4)
EOSINOPHIL NFR BLD: 2 % (ref 0–7)
ERYTHROCYTE [DISTWIDTH] IN BLOOD BY AUTOMATED COUNT: 13.2 % (ref 11.5–14.5)
GLOBULIN SER CALC-MCNC: 3.4 G/DL (ref 2–4)
GLUCOSE SERPL-MCNC: 114 MG/DL (ref 65–100)
HCT VFR BLD AUTO: 36 % (ref 35–47)
HGB BLD-MCNC: 11.6 G/DL (ref 11.5–16)
IMM GRANULOCYTES # BLD AUTO: 0 K/UL (ref 0–0.04)
IMM GRANULOCYTES NFR BLD AUTO: 0 % (ref 0–0.5)
LYMPHOCYTES # BLD: 2.2 K/UL (ref 0.8–3.5)
LYMPHOCYTES NFR BLD: 27 % (ref 12–49)
MCH RBC QN AUTO: 28.6 PG (ref 26–34)
MCHC RBC AUTO-ENTMCNC: 32.2 G/DL (ref 30–36.5)
MCV RBC AUTO: 88.9 FL (ref 80–99)
MONOCYTES # BLD: 0.6 K/UL (ref 0–1)
MONOCYTES NFR BLD: 7 % (ref 5–13)
NEUTS SEG # BLD: 5.1 K/UL (ref 1.8–8)
NEUTS SEG NFR BLD: 63 % (ref 32–75)
NRBC # BLD: 0 K/UL (ref 0–0.01)
NRBC BLD-RTO: 0 PER 100 WBC
PLATELET # BLD AUTO: 200 K/UL (ref 150–400)
PMV BLD AUTO: 11.2 FL (ref 8.9–12.9)
POTASSIUM SERPL-SCNC: 3.9 MMOL/L (ref 3.5–5.1)
PROT SERPL-MCNC: 7.1 G/DL (ref 6.4–8.2)
RBC # BLD AUTO: 4.05 M/UL (ref 3.8–5.2)
SODIUM SERPL-SCNC: 141 MMOL/L (ref 136–145)
SPECIMEN HOLD: NORMAL
TROPONIN I SERPL HS-MCNC: 8 NG/L (ref 0–51)
WBC # BLD AUTO: 8.1 K/UL (ref 3.6–11)

## 2024-07-06 PROCEDURE — 85025 COMPLETE CBC W/AUTO DIFF WBC: CPT

## 2024-07-06 PROCEDURE — 93005 ELECTROCARDIOGRAM TRACING: CPT | Performed by: STUDENT IN AN ORGANIZED HEALTH CARE EDUCATION/TRAINING PROGRAM

## 2024-07-06 PROCEDURE — 99285 EMERGENCY DEPT VISIT HI MDM: CPT

## 2024-07-06 PROCEDURE — 84484 ASSAY OF TROPONIN QUANT: CPT

## 2024-07-06 PROCEDURE — 85379 FIBRIN DEGRADATION QUANT: CPT

## 2024-07-06 PROCEDURE — 71046 X-RAY EXAM CHEST 2 VIEWS: CPT

## 2024-07-06 PROCEDURE — 80053 COMPREHEN METABOLIC PANEL: CPT

## 2024-07-06 PROCEDURE — 36415 COLL VENOUS BLD VENIPUNCTURE: CPT

## 2024-07-06 RX ORDER — METHYLPREDNISOLONE 4 MG/1
TABLET ORAL
Qty: 21 TABLET | Refills: 0 | Status: SHIPPED | OUTPATIENT
Start: 2024-07-06

## 2024-07-06 ASSESSMENT — PAIN DESCRIPTION - DESCRIPTORS: DESCRIPTORS: ACHING

## 2024-07-06 ASSESSMENT — PAIN DESCRIPTION - ORIENTATION: ORIENTATION: LEFT;ANTERIOR

## 2024-07-06 ASSESSMENT — PAIN DESCRIPTION - LOCATION: LOCATION: CHEST

## 2024-07-06 ASSESSMENT — PAIN - FUNCTIONAL ASSESSMENT: PAIN_FUNCTIONAL_ASSESSMENT: 0-10

## 2024-07-06 ASSESSMENT — PAIN SCALES - GENERAL: PAINLEVEL_OUTOF10: 8

## 2024-07-06 ASSESSMENT — PAIN DESCRIPTION - PAIN TYPE: TYPE: ACUTE PAIN

## 2024-07-07 LAB
EKG ATRIAL RATE: 84 BPM
EKG DIAGNOSIS: NORMAL
EKG P AXIS: 56 DEGREES
EKG P-R INTERVAL: 160 MS
EKG Q-T INTERVAL: 372 MS
EKG QRS DURATION: 106 MS
EKG QTC CALCULATION (BAZETT): 439 MS
EKG R AXIS: -27 DEGREES
EKG T AXIS: 48 DEGREES
EKG VENTRICULAR RATE: 84 BPM

## 2024-07-07 NOTE — ED NOTES
9:29 PM  I have evaluated the patient as the Provider in Rapid Medical Evaluation (RME). I have reviewed her vital signs and the triage nurse assessment. I have talked with the patient and any available family and advised that I am the provider in triage and have ordered the appropriate study to initiate their work up based on the clinical presentation during my assessment. I have advised that the patient will be accommodated in the Main ED as soon as possible. I have also requested to contact the triage nurse or myself immediately if the patient experiences any changes in their condition during this brief waiting period.    64 y.o. female presents to ED with CP. Patient reports that 2-3 days ago she thought she had a cold with chest congestion. However, this has since evolved to chest pain, especially to L side radiating to L shoulder blade. She notes that it has been worsening, now an 8/10. She notes she also feels SOB. She has tried various OTC medications with little relief. Denies any dizziness, N/V/D. She has history of T2DM. She has seen cardiologist at [facility] previously who told her that her R ventricle was hypertrophied but has not received an official diagnosis.    CONNIE SALAZAR [place], [place]  07/06/24 7399

## 2024-07-07 NOTE — ED PROVIDER NOTES
No edema.   Skin:     General: Skin is warm and dry.      Capillary Refill: Capillary refill takes less than 2 seconds.   Neurological:      General: No focal deficit present.      Mental Status: She is alert and oriented to person, place, and time. Mental status is at baseline.         DIAGNOSTIC RESULTS     EKG:   All EKG's are interpreted by an Emergency Department Physician who either signs or Co-signs this chart in the absence of a cardiologist. Interpretation provided in ED course below    RADIOLOGY:   Non-plain film images such as CT, Ultrasound and MRI are read by the radiologist. Plain radiographic images are visualized and preliminarily interpreted by the emergency physician with the findings as noted in the ED course.    Interpretation per the Radiologist below, if available at the time of this note:    XR CHEST (2 VW)   Final Result      No acute process.         Electronically signed by Albert Lam           LABS:  Labs Reviewed   COMPREHENSIVE METABOLIC PANEL - Abnormal; Notable for the following components:       Result Value    Glucose 114 (*)     BUN 24 (*)     Creatinine 1.10 (*)     BUN/Creatinine Ratio 22 (*)     Est, Glom Filt Rate 55 (*)     All other components within normal limits   CBC WITH AUTO DIFFERENTIAL   TROPONIN   EXTRA TUBES HOLD   D-DIMER, QUANTITATIVE       All other labs were within normal range or not returned as of this dictation.    EMERGENCY DEPARTMENT COURSE and DIFFERENTIAL DIAGNOSIS/MDM:   Vitals:    Vitals:    07/06/24 2143   BP: (!) 160/93   Pulse: 89   Resp: 20   Temp: 98 °F (36.7 °C)   TempSrc: Temporal   SpO2: 99%           Medical Decision Making  This patient presents with chest pain, with symptoms suggestive of noncardiac chest pain. History without high risk features (no diaphoresis, no exertional component, not relieved with rest.)    CAD risk factors reviewed and notable for moderate age, no known CAD, hx of DM/HLD, no current tobacco/stimulant use, +family

## 2024-07-07 NOTE — DISCHARGE INSTRUCTIONS
if you are allergic to any medicine. Keep a list of the medicines, vitamins, and herbs you take. Include the amounts, and when and why you take them. Bring the list or the pill bottles to follow-up visits. Carry your medicine list with you in case of an emergency.    Return to the Emergency Department if you experience worsening cough, fever 100.4 ° F or greater not controlled by Tylenol or Ibuprofen, recurrent vomiting, chest pain, shortness of breath, or any other concerning symptoms.

## 2024-07-07 NOTE — ED TRIAGE NOTES
Pt c/o LEFT sided chest pain that radiates to LEFT upper back and axilla. +SOB. Pain increases with movement.

## 2024-09-03 ENCOUNTER — HOSPITAL ENCOUNTER (OUTPATIENT)
Facility: HOSPITAL | Age: 64
Discharge: HOME | End: 2024-09-06
Attending: STUDENT IN AN ORGANIZED HEALTH CARE EDUCATION/TRAINING PROGRAM
Payer: COMMERCIAL

## 2024-09-03 VITALS — WEIGHT: 180 LBS | HEIGHT: 68 IN | BODY MASS INDEX: 27.28 KG/M2

## 2024-09-03 DIAGNOSIS — Z12.31 BREAST CANCER SCREENING BY MAMMOGRAM: ICD-10-CM

## 2024-09-03 PROCEDURE — 77067 SCR MAMMO BI INCL CAD: CPT
